# Patient Record
Sex: MALE | Race: WHITE | NOT HISPANIC OR LATINO
[De-identification: names, ages, dates, MRNs, and addresses within clinical notes are randomized per-mention and may not be internally consistent; named-entity substitution may affect disease eponyms.]

---

## 2021-01-01 ENCOUNTER — TRANSCRIPTION ENCOUNTER (OUTPATIENT)
Age: 86
End: 2021-01-01

## 2021-01-01 ENCOUNTER — INPATIENT (INPATIENT)
Facility: HOSPITAL | Age: 86
LOS: 4 days | Discharge: HOME HEALTH | DRG: 713 | End: 2021-10-16
Attending: SURGERY | Admitting: SURGERY
Payer: MEDICARE

## 2021-01-01 ENCOUNTER — APPOINTMENT (OUTPATIENT)
Dept: UROLOGY | Facility: HOSPITAL | Age: 86
End: 2021-01-01

## 2021-01-01 VITALS
HEART RATE: 62 BPM | RESPIRATION RATE: 16 BRPM | SYSTOLIC BLOOD PRESSURE: 138 MMHG | TEMPERATURE: 92 F | DIASTOLIC BLOOD PRESSURE: 61 MMHG | OXYGEN SATURATION: 95 %

## 2021-01-01 VITALS
SYSTOLIC BLOOD PRESSURE: 149 MMHG | HEART RATE: 88 BPM | RESPIRATION RATE: 17 BRPM | DIASTOLIC BLOOD PRESSURE: 79 MMHG | OXYGEN SATURATION: 95 % | TEMPERATURE: 98 F

## 2021-01-01 DIAGNOSIS — F03.90 UNSPECIFIED DEMENTIA, UNSPECIFIED SEVERITY, WITHOUT BEHAVIORAL DISTURBANCE, PSYCHOTIC DISTURBANCE, MOOD DISTURBANCE, AND ANXIETY: ICD-10-CM

## 2021-01-01 DIAGNOSIS — Z87.891 PERSONAL HISTORY OF NICOTINE DEPENDENCE: ICD-10-CM

## 2021-01-01 DIAGNOSIS — Z86.79 PERSONAL HISTORY OF OTHER DISEASES OF THE CIRCULATORY SYSTEM: ICD-10-CM

## 2021-01-01 DIAGNOSIS — H40.9 UNSPECIFIED GLAUCOMA: ICD-10-CM

## 2021-01-01 DIAGNOSIS — L89.623 PRESSURE ULCER OF LEFT HEEL, STAGE 3: ICD-10-CM

## 2021-01-01 DIAGNOSIS — Z86.16 PERSONAL HISTORY OF COVID-19: ICD-10-CM

## 2021-01-01 DIAGNOSIS — Z98.89 OTHER SPECIFIED POSTPROCEDURAL STATES: Chronic | ICD-10-CM

## 2021-01-01 DIAGNOSIS — R33.9 RETENTION OF URINE, UNSPECIFIED: ICD-10-CM

## 2021-01-01 DIAGNOSIS — R33.8 OTHER RETENTION OF URINE: ICD-10-CM

## 2021-01-01 DIAGNOSIS — N40.1 BENIGN PROSTATIC HYPERPLASIA WITH LOWER URINARY TRACT SYMPTOMS: ICD-10-CM

## 2021-01-01 DIAGNOSIS — Z01.818 ENCOUNTER FOR OTHER PREPROCEDURAL EXAMINATION: ICD-10-CM

## 2021-01-01 DIAGNOSIS — I45.10 UNSPECIFIED RIGHT BUNDLE-BRANCH BLOCK: ICD-10-CM

## 2021-01-01 DIAGNOSIS — E78.5 HYPERLIPIDEMIA, UNSPECIFIED: ICD-10-CM

## 2021-01-01 DIAGNOSIS — R68.0 HYPOTHERMIA, NOT ASSOCIATED WITH LOW ENVIRONMENTAL TEMPERATURE: ICD-10-CM

## 2021-01-01 DIAGNOSIS — N39.0 URINARY TRACT INFECTION, SITE NOT SPECIFIED: ICD-10-CM

## 2021-01-01 DIAGNOSIS — R45.1 RESTLESSNESS AND AGITATION: ICD-10-CM

## 2021-01-01 DIAGNOSIS — D63.8 ANEMIA IN OTHER CHRONIC DISEASES CLASSIFIED ELSEWHERE: ICD-10-CM

## 2021-01-01 DIAGNOSIS — I25.10 ATHEROSCLEROTIC HEART DISEASE OF NATIVE CORONARY ARTERY WITHOUT ANGINA PECTORIS: ICD-10-CM

## 2021-01-01 DIAGNOSIS — N13.8 OTHER OBSTRUCTIVE AND REFLUX UROPATHY: ICD-10-CM

## 2021-01-01 DIAGNOSIS — Z95.5 PRESENCE OF CORONARY ANGIOPLASTY IMPLANT AND GRAFT: ICD-10-CM

## 2021-01-01 DIAGNOSIS — I44.0 ATRIOVENTRICULAR BLOCK, FIRST DEGREE: ICD-10-CM

## 2021-01-01 DIAGNOSIS — N17.9 ACUTE KIDNEY FAILURE, UNSPECIFIED: ICD-10-CM

## 2021-01-01 DIAGNOSIS — Z87.892 PERSONAL HISTORY OF ANAPHYLAXIS: ICD-10-CM

## 2021-01-01 DIAGNOSIS — I10 ESSENTIAL (PRIMARY) HYPERTENSION: ICD-10-CM

## 2021-01-01 DIAGNOSIS — D64.9 ANEMIA, UNSPECIFIED: ICD-10-CM

## 2021-01-01 DIAGNOSIS — Z88.0 ALLERGY STATUS TO PENICILLIN: ICD-10-CM

## 2021-01-01 DIAGNOSIS — M19.90 UNSPECIFIED OSTEOARTHRITIS, UNSPECIFIED SITE: ICD-10-CM

## 2021-01-01 LAB
ANION GAP SERPL CALC-SCNC: 10 MMOL/L — SIGNIFICANT CHANGE UP (ref 5–17)
ANION GAP SERPL CALC-SCNC: 11 MMOL/L — SIGNIFICANT CHANGE UP (ref 5–17)
ANION GAP SERPL CALC-SCNC: 12 MMOL/L — SIGNIFICANT CHANGE UP (ref 5–17)
ANION GAP SERPL CALC-SCNC: 9 MMOL/L — SIGNIFICANT CHANGE UP (ref 5–17)
ANION GAP SERPL CALC-SCNC: 9 MMOL/L — SIGNIFICANT CHANGE UP (ref 5–17)
APTT BLD: 38.1 SEC — HIGH (ref 27.5–35.5)
BASOPHILS # BLD AUTO: 0.03 K/UL — SIGNIFICANT CHANGE UP (ref 0–0.2)
BASOPHILS # BLD AUTO: 0.05 K/UL — SIGNIFICANT CHANGE UP (ref 0–0.2)
BASOPHILS # BLD AUTO: 0.05 K/UL — SIGNIFICANT CHANGE UP (ref 0–0.2)
BASOPHILS NFR BLD AUTO: 0.4 % — SIGNIFICANT CHANGE UP (ref 0–2)
BASOPHILS NFR BLD AUTO: 0.7 % — SIGNIFICANT CHANGE UP (ref 0–2)
BASOPHILS NFR BLD AUTO: 0.9 % — SIGNIFICANT CHANGE UP (ref 0–2)
BLD GP AB SCN SERPL QL: NEGATIVE — SIGNIFICANT CHANGE UP
BLD GP AB SCN SERPL QL: NEGATIVE — SIGNIFICANT CHANGE UP
BUN SERPL-MCNC: 18 MG/DL — SIGNIFICANT CHANGE UP (ref 7–23)
BUN SERPL-MCNC: 19 MG/DL — SIGNIFICANT CHANGE UP (ref 7–23)
BUN SERPL-MCNC: 20 MG/DL — SIGNIFICANT CHANGE UP (ref 7–23)
BUN SERPL-MCNC: 27 MG/DL — HIGH (ref 7–23)
BUN SERPL-MCNC: 28 MG/DL — HIGH (ref 7–23)
CALCIUM SERPL-MCNC: 9 MG/DL — SIGNIFICANT CHANGE UP (ref 8.4–10.5)
CALCIUM SERPL-MCNC: 9.3 MG/DL — SIGNIFICANT CHANGE UP (ref 8.4–10.5)
CALCIUM SERPL-MCNC: 9.3 MG/DL — SIGNIFICANT CHANGE UP (ref 8.4–10.5)
CALCIUM SERPL-MCNC: 9.4 MG/DL — SIGNIFICANT CHANGE UP (ref 8.4–10.5)
CALCIUM SERPL-MCNC: 9.4 MG/DL — SIGNIFICANT CHANGE UP (ref 8.4–10.5)
CHLORIDE SERPL-SCNC: 103 MMOL/L — SIGNIFICANT CHANGE UP (ref 96–108)
CHLORIDE SERPL-SCNC: 104 MMOL/L — SIGNIFICANT CHANGE UP (ref 96–108)
CHLORIDE SERPL-SCNC: 105 MMOL/L — SIGNIFICANT CHANGE UP (ref 96–108)
CHLORIDE SERPL-SCNC: 106 MMOL/L — SIGNIFICANT CHANGE UP (ref 96–108)
CHLORIDE SERPL-SCNC: 108 MMOL/L — SIGNIFICANT CHANGE UP (ref 96–108)
CO2 SERPL-SCNC: 24 MMOL/L — SIGNIFICANT CHANGE UP (ref 22–31)
CO2 SERPL-SCNC: 25 MMOL/L — SIGNIFICANT CHANGE UP (ref 22–31)
CO2 SERPL-SCNC: 25 MMOL/L — SIGNIFICANT CHANGE UP (ref 22–31)
CO2 SERPL-SCNC: 26 MMOL/L — SIGNIFICANT CHANGE UP (ref 22–31)
CO2 SERPL-SCNC: 27 MMOL/L — SIGNIFICANT CHANGE UP (ref 22–31)
CORTIS AM PEAK SERPL-MCNC: 12.78 UG/DL — SIGNIFICANT CHANGE UP (ref 6.02–18.4)
COVID-19 SPIKE DOMAIN AB INTERP: POSITIVE
COVID-19 SPIKE DOMAIN ANTIBODY RESULT: >250 U/ML — HIGH
CREAT SERPL-MCNC: 1.14 MG/DL — SIGNIFICANT CHANGE UP (ref 0.5–1.3)
CREAT SERPL-MCNC: 1.34 MG/DL — HIGH (ref 0.5–1.3)
CREAT SERPL-MCNC: 1.39 MG/DL — HIGH (ref 0.5–1.3)
CREAT SERPL-MCNC: 1.4 MG/DL — HIGH (ref 0.5–1.3)
CREAT SERPL-MCNC: 1.46 MG/DL — HIGH (ref 0.5–1.3)
CULTURE RESULTS: NO GROWTH — SIGNIFICANT CHANGE UP
CULTURE RESULTS: NO GROWTH — SIGNIFICANT CHANGE UP
CULTURE RESULTS: SIGNIFICANT CHANGE UP
EOSINOPHIL # BLD AUTO: 0.07 K/UL — SIGNIFICANT CHANGE UP (ref 0–0.5)
EOSINOPHIL # BLD AUTO: 0.14 K/UL — SIGNIFICANT CHANGE UP (ref 0–0.5)
EOSINOPHIL # BLD AUTO: 0.28 K/UL — SIGNIFICANT CHANGE UP (ref 0–0.5)
EOSINOPHIL NFR BLD AUTO: 1 % — SIGNIFICANT CHANGE UP (ref 0–6)
EOSINOPHIL NFR BLD AUTO: 1.8 % — SIGNIFICANT CHANGE UP (ref 0–6)
EOSINOPHIL NFR BLD AUTO: 5.2 % — SIGNIFICANT CHANGE UP (ref 0–6)
GLUCOSE SERPL-MCNC: 113 MG/DL — HIGH (ref 70–99)
GLUCOSE SERPL-MCNC: 113 MG/DL — HIGH (ref 70–99)
GLUCOSE SERPL-MCNC: 114 MG/DL — HIGH (ref 70–99)
GLUCOSE SERPL-MCNC: 119 MG/DL — HIGH (ref 70–99)
GLUCOSE SERPL-MCNC: 88 MG/DL — SIGNIFICANT CHANGE UP (ref 70–99)
HCT VFR BLD CALC: 29.5 % — LOW (ref 39–50)
HCT VFR BLD CALC: 30.7 % — LOW (ref 39–50)
HCT VFR BLD CALC: 31.5 % — LOW (ref 39–50)
HCT VFR BLD CALC: 31.7 % — LOW (ref 39–50)
HCT VFR BLD CALC: 33.7 % — LOW (ref 39–50)
HGB BLD-MCNC: 10.1 G/DL — LOW (ref 13–17)
HGB BLD-MCNC: 9.4 G/DL — LOW (ref 13–17)
HGB BLD-MCNC: 9.5 G/DL — LOW (ref 13–17)
HGB BLD-MCNC: 9.8 G/DL — LOW (ref 13–17)
HGB BLD-MCNC: 9.9 G/DL — LOW (ref 13–17)
IMM GRANULOCYTES NFR BLD AUTO: 0.4 % — SIGNIFICANT CHANGE UP (ref 0–1.5)
IMM GRANULOCYTES NFR BLD AUTO: 0.6 % — SIGNIFICANT CHANGE UP (ref 0–1.5)
IMM GRANULOCYTES NFR BLD AUTO: 0.9 % — SIGNIFICANT CHANGE UP (ref 0–1.5)
INR BLD: 1.07 — SIGNIFICANT CHANGE UP (ref 0.88–1.16)
LYMPHOCYTES # BLD AUTO: 1.04 K/UL — SIGNIFICANT CHANGE UP (ref 1–3.3)
LYMPHOCYTES # BLD AUTO: 1.24 K/UL — SIGNIFICANT CHANGE UP (ref 1–3.3)
LYMPHOCYTES # BLD AUTO: 1.84 K/UL — SIGNIFICANT CHANGE UP (ref 1–3.3)
LYMPHOCYTES # BLD AUTO: 15.4 % — SIGNIFICANT CHANGE UP (ref 13–44)
LYMPHOCYTES # BLD AUTO: 16.3 % — SIGNIFICANT CHANGE UP (ref 13–44)
LYMPHOCYTES # BLD AUTO: 34.1 % — SIGNIFICANT CHANGE UP (ref 13–44)
MAGNESIUM SERPL-MCNC: 1.6 MG/DL — SIGNIFICANT CHANGE UP (ref 1.6–2.6)
MAGNESIUM SERPL-MCNC: 1.7 MG/DL — SIGNIFICANT CHANGE UP (ref 1.6–2.6)
MAGNESIUM SERPL-MCNC: 1.8 MG/DL — SIGNIFICANT CHANGE UP (ref 1.6–2.6)
MAGNESIUM SERPL-MCNC: 1.8 MG/DL — SIGNIFICANT CHANGE UP (ref 1.6–2.6)
MAGNESIUM SERPL-MCNC: 2 MG/DL — SIGNIFICANT CHANGE UP (ref 1.6–2.6)
MCHC RBC-ENTMCNC: 27.7 PG — SIGNIFICANT CHANGE UP (ref 27–34)
MCHC RBC-ENTMCNC: 28.3 PG — SIGNIFICANT CHANGE UP (ref 27–34)
MCHC RBC-ENTMCNC: 28.3 PG — SIGNIFICANT CHANGE UP (ref 27–34)
MCHC RBC-ENTMCNC: 28.4 PG — SIGNIFICANT CHANGE UP (ref 27–34)
MCHC RBC-ENTMCNC: 28.7 PG — SIGNIFICANT CHANGE UP (ref 27–34)
MCHC RBC-ENTMCNC: 30 GM/DL — LOW (ref 32–36)
MCHC RBC-ENTMCNC: 30.9 GM/DL — LOW (ref 32–36)
MCHC RBC-ENTMCNC: 31.1 GM/DL — LOW (ref 32–36)
MCHC RBC-ENTMCNC: 31.2 GM/DL — LOW (ref 32–36)
MCHC RBC-ENTMCNC: 31.9 GM/DL — LOW (ref 32–36)
MCV RBC AUTO: 89.9 FL — SIGNIFICANT CHANGE UP (ref 80–100)
MCV RBC AUTO: 91 FL — SIGNIFICANT CHANGE UP (ref 80–100)
MCV RBC AUTO: 91.1 FL — SIGNIFICANT CHANGE UP (ref 80–100)
MCV RBC AUTO: 91.4 FL — SIGNIFICANT CHANGE UP (ref 80–100)
MCV RBC AUTO: 92.6 FL — SIGNIFICANT CHANGE UP (ref 80–100)
MONOCYTES # BLD AUTO: 0.58 K/UL — SIGNIFICANT CHANGE UP (ref 0–0.9)
MONOCYTES # BLD AUTO: 0.66 K/UL — SIGNIFICANT CHANGE UP (ref 0–0.9)
MONOCYTES # BLD AUTO: 0.79 K/UL — SIGNIFICANT CHANGE UP (ref 0–0.9)
MONOCYTES NFR BLD AUTO: 14.6 % — HIGH (ref 2–14)
MONOCYTES NFR BLD AUTO: 7.6 % — SIGNIFICANT CHANGE UP (ref 2–14)
MONOCYTES NFR BLD AUTO: 9.8 % — SIGNIFICANT CHANGE UP (ref 2–14)
NEUTROPHILS # BLD AUTO: 2.41 K/UL — SIGNIFICANT CHANGE UP (ref 1.8–7.4)
NEUTROPHILS # BLD AUTO: 4.91 K/UL — SIGNIFICANT CHANGE UP (ref 1.8–7.4)
NEUTROPHILS # BLD AUTO: 5.55 K/UL — SIGNIFICANT CHANGE UP (ref 1.8–7.4)
NEUTROPHILS NFR BLD AUTO: 44.6 % — SIGNIFICANT CHANGE UP (ref 43–77)
NEUTROPHILS NFR BLD AUTO: 72.7 % — SIGNIFICANT CHANGE UP (ref 43–77)
NEUTROPHILS NFR BLD AUTO: 73 % — SIGNIFICANT CHANGE UP (ref 43–77)
NRBC # BLD: 0 /100 WBCS — SIGNIFICANT CHANGE UP (ref 0–0)
PHOSPHATE SERPL-MCNC: 2.9 MG/DL — SIGNIFICANT CHANGE UP (ref 2.5–4.5)
PHOSPHATE SERPL-MCNC: 3 MG/DL — SIGNIFICANT CHANGE UP (ref 2.5–4.5)
PHOSPHATE SERPL-MCNC: 3.6 MG/DL — SIGNIFICANT CHANGE UP (ref 2.5–4.5)
PHOSPHATE SERPL-MCNC: 3.7 MG/DL — SIGNIFICANT CHANGE UP (ref 2.5–4.5)
PHOSPHATE SERPL-MCNC: 3.9 MG/DL — SIGNIFICANT CHANGE UP (ref 2.5–4.5)
PLATELET # BLD AUTO: 196 K/UL — SIGNIFICANT CHANGE UP (ref 150–400)
PLATELET # BLD AUTO: 216 K/UL — SIGNIFICANT CHANGE UP (ref 150–400)
PLATELET # BLD AUTO: 218 K/UL — SIGNIFICANT CHANGE UP (ref 150–400)
PLATELET # BLD AUTO: 238 K/UL — SIGNIFICANT CHANGE UP (ref 150–400)
PLATELET # BLD AUTO: 252 K/UL — SIGNIFICANT CHANGE UP (ref 150–400)
POTASSIUM SERPL-MCNC: 3.8 MMOL/L — SIGNIFICANT CHANGE UP (ref 3.5–5.3)
POTASSIUM SERPL-MCNC: 3.9 MMOL/L — SIGNIFICANT CHANGE UP (ref 3.5–5.3)
POTASSIUM SERPL-MCNC: 4.1 MMOL/L — SIGNIFICANT CHANGE UP (ref 3.5–5.3)
POTASSIUM SERPL-MCNC: 4.2 MMOL/L — SIGNIFICANT CHANGE UP (ref 3.5–5.3)
POTASSIUM SERPL-MCNC: 4.2 MMOL/L — SIGNIFICANT CHANGE UP (ref 3.5–5.3)
POTASSIUM SERPL-SCNC: 3.8 MMOL/L — SIGNIFICANT CHANGE UP (ref 3.5–5.3)
POTASSIUM SERPL-SCNC: 3.9 MMOL/L — SIGNIFICANT CHANGE UP (ref 3.5–5.3)
POTASSIUM SERPL-SCNC: 4.1 MMOL/L — SIGNIFICANT CHANGE UP (ref 3.5–5.3)
POTASSIUM SERPL-SCNC: 4.2 MMOL/L — SIGNIFICANT CHANGE UP (ref 3.5–5.3)
POTASSIUM SERPL-SCNC: 4.2 MMOL/L — SIGNIFICANT CHANGE UP (ref 3.5–5.3)
PROTHROM AB SERPL-ACNC: 12.8 SEC — SIGNIFICANT CHANGE UP (ref 10.6–13.6)
RBC # BLD: 3.28 M/UL — LOW (ref 4.2–5.8)
RBC # BLD: 3.36 M/UL — LOW (ref 4.2–5.8)
RBC # BLD: 3.46 M/UL — LOW (ref 4.2–5.8)
RBC # BLD: 3.48 M/UL — LOW (ref 4.2–5.8)
RBC # BLD: 3.64 M/UL — LOW (ref 4.2–5.8)
RBC # FLD: 18.1 % — HIGH (ref 10.3–14.5)
RBC # FLD: 18.1 % — HIGH (ref 10.3–14.5)
RBC # FLD: 18.4 % — HIGH (ref 10.3–14.5)
RBC # FLD: 18.5 % — HIGH (ref 10.3–14.5)
RBC # FLD: 18.6 % — HIGH (ref 10.3–14.5)
RH IG SCN BLD-IMP: POSITIVE — SIGNIFICANT CHANGE UP
RH IG SCN BLD-IMP: POSITIVE — SIGNIFICANT CHANGE UP
SARS-COV-2 IGG+IGM SERPL QL IA: >250 U/ML — HIGH
SARS-COV-2 IGG+IGM SERPL QL IA: POSITIVE
SARS-COV-2 RNA SPEC QL NAA+PROBE: SIGNIFICANT CHANGE UP
SODIUM SERPL-SCNC: 140 MMOL/L — SIGNIFICANT CHANGE UP (ref 135–145)
SODIUM SERPL-SCNC: 140 MMOL/L — SIGNIFICANT CHANGE UP (ref 135–145)
SODIUM SERPL-SCNC: 141 MMOL/L — SIGNIFICANT CHANGE UP (ref 135–145)
SODIUM SERPL-SCNC: 141 MMOL/L — SIGNIFICANT CHANGE UP (ref 135–145)
SODIUM SERPL-SCNC: 142 MMOL/L — SIGNIFICANT CHANGE UP (ref 135–145)
SPECIMEN SOURCE: SIGNIFICANT CHANGE UP
TSH SERPL-MCNC: 1.95 UIU/ML — SIGNIFICANT CHANGE UP (ref 0.27–4.2)
WBC # BLD: 5.4 K/UL — SIGNIFICANT CHANGE UP (ref 3.8–10.5)
WBC # BLD: 5.56 K/UL — SIGNIFICANT CHANGE UP (ref 3.8–10.5)
WBC # BLD: 6.21 K/UL — SIGNIFICANT CHANGE UP (ref 3.8–10.5)
WBC # BLD: 6.76 K/UL — SIGNIFICANT CHANGE UP (ref 3.8–10.5)
WBC # BLD: 7.61 K/UL — SIGNIFICANT CHANGE UP (ref 3.8–10.5)
WBC # FLD AUTO: 5.4 K/UL — SIGNIFICANT CHANGE UP (ref 3.8–10.5)
WBC # FLD AUTO: 5.56 K/UL — SIGNIFICANT CHANGE UP (ref 3.8–10.5)
WBC # FLD AUTO: 6.21 K/UL — SIGNIFICANT CHANGE UP (ref 3.8–10.5)
WBC # FLD AUTO: 6.76 K/UL — SIGNIFICANT CHANGE UP (ref 3.8–10.5)
WBC # FLD AUTO: 7.61 K/UL — SIGNIFICANT CHANGE UP (ref 3.8–10.5)

## 2021-01-01 PROCEDURE — 85610 PROTHROMBIN TIME: CPT

## 2021-01-01 PROCEDURE — 87040 BLOOD CULTURE FOR BACTERIA: CPT

## 2021-01-01 PROCEDURE — P9016: CPT

## 2021-01-01 PROCEDURE — 83735 ASSAY OF MAGNESIUM: CPT

## 2021-01-01 PROCEDURE — 84100 ASSAY OF PHOSPHORUS: CPT

## 2021-01-01 PROCEDURE — 84443 ASSAY THYROID STIM HORMONE: CPT

## 2021-01-01 PROCEDURE — 86900 BLOOD TYPING SEROLOGIC ABO: CPT

## 2021-01-01 PROCEDURE — 85027 COMPLETE CBC AUTOMATED: CPT

## 2021-01-01 PROCEDURE — 82533 TOTAL CORTISOL: CPT

## 2021-01-01 PROCEDURE — 80048 BASIC METABOLIC PNL TOTAL CA: CPT

## 2021-01-01 PROCEDURE — 90662 IIV NO PRSV INCREASED AG IM: CPT

## 2021-01-01 PROCEDURE — 97110 THERAPEUTIC EXERCISES: CPT

## 2021-01-01 PROCEDURE — 52648 LASER SURGERY OF PROSTATE: CPT

## 2021-01-01 PROCEDURE — 36415 COLL VENOUS BLD VENIPUNCTURE: CPT

## 2021-01-01 PROCEDURE — 97530 THERAPEUTIC ACTIVITIES: CPT

## 2021-01-01 PROCEDURE — 85730 THROMBOPLASTIN TIME PARTIAL: CPT

## 2021-01-01 PROCEDURE — 99233 SBSQ HOSP IP/OBS HIGH 50: CPT

## 2021-01-01 PROCEDURE — 71045 X-RAY EXAM CHEST 1 VIEW: CPT | Mod: 26

## 2021-01-01 PROCEDURE — 99221 1ST HOSP IP/OBS SF/LOW 40: CPT

## 2021-01-01 PROCEDURE — 76775 US EXAM ABDO BACK WALL LIM: CPT | Mod: 26

## 2021-01-01 PROCEDURE — 86901 BLOOD TYPING SEROLOGIC RH(D): CPT

## 2021-01-01 PROCEDURE — 87635 SARS-COV-2 COVID-19 AMP PRB: CPT

## 2021-01-01 PROCEDURE — C1889: CPT

## 2021-01-01 PROCEDURE — 36430 TRANSFUSION BLD/BLD COMPNT: CPT

## 2021-01-01 PROCEDURE — 97161 PT EVAL LOW COMPLEX 20 MIN: CPT

## 2021-01-01 PROCEDURE — 86923 COMPATIBILITY TEST ELECTRIC: CPT

## 2021-01-01 PROCEDURE — 86850 RBC ANTIBODY SCREEN: CPT

## 2021-01-01 PROCEDURE — 76775 US EXAM ABDO BACK WALL LIM: CPT

## 2021-01-01 PROCEDURE — 86769 SARS-COV-2 COVID-19 ANTIBODY: CPT

## 2021-01-01 PROCEDURE — 71045 X-RAY EXAM CHEST 1 VIEW: CPT

## 2021-01-01 PROCEDURE — 87086 URINE CULTURE/COLONY COUNT: CPT

## 2021-01-01 PROCEDURE — 85025 COMPLETE CBC W/AUTO DIFF WBC: CPT

## 2021-01-01 PROCEDURE — 99233 SBSQ HOSP IP/OBS HIGH 50: CPT | Mod: GC

## 2021-01-01 RX ORDER — LATANOPROST 0.05 MG/ML
1 SOLUTION/ DROPS OPHTHALMIC; TOPICAL AT BEDTIME
Refills: 0 | Status: DISCONTINUED | OUTPATIENT
Start: 2021-01-01 | End: 2021-01-01

## 2021-01-01 RX ORDER — ACETAMINOPHEN 500 MG
650 TABLET ORAL EVERY 6 HOURS
Refills: 0 | Status: DISCONTINUED | OUTPATIENT
Start: 2021-01-01 | End: 2021-01-01

## 2021-01-01 RX ORDER — DORZOLAMIDE HYDROCHLORIDE 20 MG/ML
1 SOLUTION/ DROPS OPHTHALMIC THREE TIMES A DAY
Refills: 0 | Status: DISCONTINUED | OUTPATIENT
Start: 2021-01-01 | End: 2021-01-01

## 2021-01-01 RX ORDER — SODIUM CHLORIDE 9 MG/ML
1000 INJECTION INTRAMUSCULAR; INTRAVENOUS; SUBCUTANEOUS
Refills: 0 | Status: DISCONTINUED | OUTPATIENT
Start: 2021-01-01 | End: 2021-01-01

## 2021-01-01 RX ORDER — ATORVASTATIN CALCIUM 80 MG/1
1 TABLET, FILM COATED ORAL
Qty: 0 | Refills: 0 | DISCHARGE
Start: 2021-01-01

## 2021-01-01 RX ORDER — PANTOPRAZOLE SODIUM 20 MG/1
40 TABLET, DELAYED RELEASE ORAL
Refills: 0 | Status: DISCONTINUED | OUTPATIENT
Start: 2021-01-01 | End: 2021-01-01

## 2021-01-01 RX ORDER — OLANZAPINE 15 MG/1
2.5 TABLET, FILM COATED ORAL ONCE
Refills: 0 | Status: COMPLETED | OUTPATIENT
Start: 2021-01-01 | End: 2021-01-01

## 2021-01-01 RX ORDER — OLANZAPINE 15 MG/1
1 TABLET, FILM COATED ORAL
Qty: 0 | Refills: 0 | DISCHARGE
Start: 2021-01-01

## 2021-01-01 RX ORDER — CHOLECALCIFEROL (VITAMIN D3) 125 MCG
2000 CAPSULE ORAL
Qty: 0 | Refills: 0 | DISCHARGE
Start: 2021-01-01

## 2021-01-01 RX ORDER — AMLODIPINE BESYLATE 2.5 MG/1
5 TABLET ORAL DAILY
Refills: 0 | Status: DISCONTINUED | OUTPATIENT
Start: 2021-01-01 | End: 2021-01-01

## 2021-01-01 RX ORDER — POLYETHYLENE GLYCOL 3350 17 G/17G
17 POWDER, FOR SOLUTION ORAL DAILY
Refills: 0 | Status: DISCONTINUED | OUTPATIENT
Start: 2021-01-01 | End: 2021-01-01

## 2021-01-01 RX ORDER — AMLODIPINE BESYLATE 2.5 MG/1
1 TABLET ORAL
Qty: 0 | Refills: 0 | DISCHARGE
Start: 2021-01-01

## 2021-01-01 RX ORDER — MAGNESIUM SULFATE 500 MG/ML
1 VIAL (ML) INJECTION ONCE
Refills: 0 | Status: COMPLETED | OUTPATIENT
Start: 2021-01-01 | End: 2021-01-01

## 2021-01-01 RX ORDER — TAMSULOSIN HYDROCHLORIDE 0.4 MG/1
0.4 CAPSULE ORAL ONCE
Refills: 0 | Status: COMPLETED | OUTPATIENT
Start: 2021-01-01 | End: 2021-01-01

## 2021-01-01 RX ORDER — ONDANSETRON 8 MG/1
4 TABLET, FILM COATED ORAL EVERY 4 HOURS
Refills: 0 | Status: DISCONTINUED | OUTPATIENT
Start: 2021-01-01 | End: 2021-01-01

## 2021-01-01 RX ORDER — ASPIRIN/CALCIUM CARB/MAGNESIUM 324 MG
81 TABLET ORAL DAILY
Refills: 0 | Status: DISCONTINUED | OUTPATIENT
Start: 2021-01-01 | End: 2021-01-01

## 2021-01-01 RX ORDER — OLANZAPINE 15 MG/1
2.5 TABLET, FILM COATED ORAL
Refills: 0 | Status: DISCONTINUED | OUTPATIENT
Start: 2021-01-01 | End: 2021-01-01

## 2021-01-01 RX ORDER — SODIUM CHLORIDE 9 MG/ML
1000 INJECTION, SOLUTION INTRAVENOUS
Refills: 0 | Status: DISCONTINUED | OUTPATIENT
Start: 2021-01-01 | End: 2021-01-01

## 2021-01-01 RX ORDER — INFLUENZA VIRUS VACCINE 15; 15; 15; 15 UG/.5ML; UG/.5ML; UG/.5ML; UG/.5ML
0.7 SUSPENSION INTRAMUSCULAR ONCE
Refills: 0 | Status: COMPLETED | OUTPATIENT
Start: 2021-01-01 | End: 2021-01-01

## 2021-01-01 RX ORDER — CHOLECALCIFEROL (VITAMIN D3) 125 MCG
2000 CAPSULE ORAL DAILY
Refills: 0 | Status: DISCONTINUED | OUTPATIENT
Start: 2021-01-01 | End: 2021-01-01

## 2021-01-01 RX ORDER — QUETIAPINE FUMARATE 200 MG/1
25 TABLET, FILM COATED ORAL ONCE
Refills: 0 | Status: COMPLETED | OUTPATIENT
Start: 2021-01-01 | End: 2021-01-01

## 2021-01-01 RX ORDER — PANTOPRAZOLE SODIUM 20 MG/1
1 TABLET, DELAYED RELEASE ORAL
Qty: 0 | Refills: 0 | DISCHARGE
Start: 2021-01-01

## 2021-01-01 RX ORDER — OLANZAPINE 15 MG/1
2.5 TABLET, FILM COATED ORAL AT BEDTIME
Refills: 0 | Status: DISCONTINUED | OUTPATIENT
Start: 2021-01-01 | End: 2021-01-01

## 2021-01-01 RX ORDER — CEFUROXIME AXETIL 250 MG
250 TABLET ORAL EVERY 12 HOURS
Refills: 0 | Status: DISCONTINUED | OUTPATIENT
Start: 2021-01-01 | End: 2021-01-01

## 2021-01-01 RX ORDER — OLANZAPINE 15 MG/1
2.5 TABLET, FILM COATED ORAL DAILY
Refills: 0 | Status: DISCONTINUED | OUTPATIENT
Start: 2021-01-01 | End: 2021-01-01

## 2021-01-01 RX ORDER — LATANOPROST 0.05 MG/ML
1 SOLUTION/ DROPS OPHTHALMIC; TOPICAL
Qty: 0 | Refills: 0 | DISCHARGE
Start: 2021-01-01

## 2021-01-01 RX ORDER — HEPARIN SODIUM 5000 [USP'U]/ML
5000 INJECTION INTRAVENOUS; SUBCUTANEOUS EVERY 8 HOURS
Refills: 0 | Status: DISCONTINUED | OUTPATIENT
Start: 2021-01-01 | End: 2021-01-01

## 2021-01-01 RX ORDER — ATORVASTATIN CALCIUM 80 MG/1
40 TABLET, FILM COATED ORAL AT BEDTIME
Refills: 0 | Status: DISCONTINUED | OUTPATIENT
Start: 2021-01-01 | End: 2021-01-01

## 2021-01-01 RX ORDER — PREGABALIN 225 MG/1
1 CAPSULE ORAL
Qty: 0 | Refills: 0 | DISCHARGE
Start: 2021-01-01

## 2021-01-01 RX ORDER — CEFTRIAXONE 500 MG/1
1000 INJECTION, POWDER, FOR SOLUTION INTRAMUSCULAR; INTRAVENOUS EVERY 24 HOURS
Refills: 0 | Status: DISCONTINUED | OUTPATIENT
Start: 2021-01-01 | End: 2021-01-01

## 2021-01-01 RX ORDER — CALCIUM CARBONATE 500(1250)
2.6 TABLET ORAL
Qty: 0 | Refills: 0 | DISCHARGE
Start: 2021-01-01

## 2021-01-01 RX ORDER — DORZOLAMIDE HYDROCHLORIDE 20 MG/ML
1 SOLUTION/ DROPS OPHTHALMIC
Qty: 0 | Refills: 0 | DISCHARGE
Start: 2021-01-01

## 2021-01-01 RX ORDER — CALCIUM CARBONATE 500(1250)
650 TABLET ORAL
Refills: 0 | Status: DISCONTINUED | OUTPATIENT
Start: 2021-01-01 | End: 2021-01-01

## 2021-01-01 RX ORDER — PREGABALIN 225 MG/1
1000 CAPSULE ORAL DAILY
Refills: 0 | Status: DISCONTINUED | OUTPATIENT
Start: 2021-01-01 | End: 2021-01-01

## 2021-01-01 RX ADMIN — HEPARIN SODIUM 5000 UNIT(S): 5000 INJECTION INTRAVENOUS; SUBCUTANEOUS at 05:36

## 2021-01-01 RX ADMIN — OLANZAPINE 2.5 MILLIGRAM(S): 15 TABLET, FILM COATED ORAL at 06:31

## 2021-01-01 RX ADMIN — POLYETHYLENE GLYCOL 3350 17 GRAM(S): 17 POWDER, FOR SOLUTION ORAL at 14:15

## 2021-01-01 RX ADMIN — LATANOPROST 1 DROP(S): 0.05 SOLUTION/ DROPS OPHTHALMIC; TOPICAL at 22:39

## 2021-01-01 RX ADMIN — DORZOLAMIDE HYDROCHLORIDE 1 DROP(S): 20 SOLUTION/ DROPS OPHTHALMIC at 07:37

## 2021-01-01 RX ADMIN — INFLUENZA VIRUS VACCINE 0.7 MILLILITER(S): 15; 15; 15; 15 SUSPENSION INTRAMUSCULAR at 22:31

## 2021-01-01 RX ADMIN — PANTOPRAZOLE SODIUM 40 MILLIGRAM(S): 20 TABLET, DELAYED RELEASE ORAL at 07:37

## 2021-01-01 RX ADMIN — HEPARIN SODIUM 5000 UNIT(S): 5000 INJECTION INTRAVENOUS; SUBCUTANEOUS at 22:17

## 2021-01-01 RX ADMIN — PANTOPRAZOLE SODIUM 40 MILLIGRAM(S): 20 TABLET, DELAYED RELEASE ORAL at 06:31

## 2021-01-01 RX ADMIN — Medication 2000 UNIT(S): at 12:32

## 2021-01-01 RX ADMIN — HEPARIN SODIUM 5000 UNIT(S): 5000 INJECTION INTRAVENOUS; SUBCUTANEOUS at 14:15

## 2021-01-01 RX ADMIN — Medication 2000 UNIT(S): at 14:14

## 2021-01-01 RX ADMIN — Medication 650 MILLIGRAM(S): at 06:21

## 2021-01-01 RX ADMIN — AMLODIPINE BESYLATE 5 MILLIGRAM(S): 2.5 TABLET ORAL at 06:31

## 2021-01-01 RX ADMIN — DORZOLAMIDE HYDROCHLORIDE 1 DROP(S): 20 SOLUTION/ DROPS OPHTHALMIC at 23:50

## 2021-01-01 RX ADMIN — PREGABALIN 1000 MICROGRAM(S): 225 CAPSULE ORAL at 12:32

## 2021-01-01 RX ADMIN — Medication 650 MILLIGRAM(S): at 05:35

## 2021-01-01 RX ADMIN — DORZOLAMIDE HYDROCHLORIDE 1 DROP(S): 20 SOLUTION/ DROPS OPHTHALMIC at 22:14

## 2021-01-01 RX ADMIN — Medication 650 MILLIGRAM(S): at 06:24

## 2021-01-01 RX ADMIN — AMLODIPINE BESYLATE 5 MILLIGRAM(S): 2.5 TABLET ORAL at 06:21

## 2021-01-01 RX ADMIN — HEPARIN SODIUM 5000 UNIT(S): 5000 INJECTION INTRAVENOUS; SUBCUTANEOUS at 14:25

## 2021-01-01 RX ADMIN — Medication 650 MILLIGRAM(S): at 12:36

## 2021-01-01 RX ADMIN — DORZOLAMIDE HYDROCHLORIDE 1 DROP(S): 20 SOLUTION/ DROPS OPHTHALMIC at 14:25

## 2021-01-01 RX ADMIN — DORZOLAMIDE HYDROCHLORIDE 1 DROP(S): 20 SOLUTION/ DROPS OPHTHALMIC at 06:19

## 2021-01-01 RX ADMIN — Medication 81 MILLIGRAM(S): at 12:32

## 2021-01-01 RX ADMIN — Medication 2000 UNIT(S): at 12:19

## 2021-01-01 RX ADMIN — PANTOPRAZOLE SODIUM 40 MILLIGRAM(S): 20 TABLET, DELAYED RELEASE ORAL at 06:29

## 2021-01-01 RX ADMIN — HEPARIN SODIUM 5000 UNIT(S): 5000 INJECTION INTRAVENOUS; SUBCUTANEOUS at 13:15

## 2021-01-01 RX ADMIN — ATORVASTATIN CALCIUM 40 MILLIGRAM(S): 80 TABLET, FILM COATED ORAL at 00:10

## 2021-01-01 RX ADMIN — DORZOLAMIDE HYDROCHLORIDE 1 DROP(S): 20 SOLUTION/ DROPS OPHTHALMIC at 06:24

## 2021-01-01 RX ADMIN — Medication 81 MILLIGRAM(S): at 12:19

## 2021-01-01 RX ADMIN — DORZOLAMIDE HYDROCHLORIDE 1 DROP(S): 20 SOLUTION/ DROPS OPHTHALMIC at 06:31

## 2021-01-01 RX ADMIN — DORZOLAMIDE HYDROCHLORIDE 1 DROP(S): 20 SOLUTION/ DROPS OPHTHALMIC at 13:12

## 2021-01-01 RX ADMIN — DORZOLAMIDE HYDROCHLORIDE 1 DROP(S): 20 SOLUTION/ DROPS OPHTHALMIC at 22:38

## 2021-01-01 RX ADMIN — DORZOLAMIDE HYDROCHLORIDE 1 DROP(S): 20 SOLUTION/ DROPS OPHTHALMIC at 22:16

## 2021-01-01 RX ADMIN — Medication 650 MILLIGRAM(S): at 23:33

## 2021-01-01 RX ADMIN — HEPARIN SODIUM 5000 UNIT(S): 5000 INJECTION INTRAVENOUS; SUBCUTANEOUS at 07:37

## 2021-01-01 RX ADMIN — ATORVASTATIN CALCIUM 40 MILLIGRAM(S): 80 TABLET, FILM COATED ORAL at 22:33

## 2021-01-01 RX ADMIN — HEPARIN SODIUM 5000 UNIT(S): 5000 INJECTION INTRAVENOUS; SUBCUTANEOUS at 06:30

## 2021-01-01 RX ADMIN — ATORVASTATIN CALCIUM 40 MILLIGRAM(S): 80 TABLET, FILM COATED ORAL at 22:17

## 2021-01-01 RX ADMIN — Medication 650 MILLIGRAM(S): at 13:36

## 2021-01-01 RX ADMIN — AMLODIPINE BESYLATE 5 MILLIGRAM(S): 2.5 TABLET ORAL at 07:37

## 2021-01-01 RX ADMIN — LATANOPROST 1 DROP(S): 0.05 SOLUTION/ DROPS OPHTHALMIC; TOPICAL at 22:15

## 2021-01-01 RX ADMIN — DORZOLAMIDE HYDROCHLORIDE 1 DROP(S): 20 SOLUTION/ DROPS OPHTHALMIC at 22:52

## 2021-01-01 RX ADMIN — Medication 650 MILLIGRAM(S): at 22:33

## 2021-01-01 RX ADMIN — PREGABALIN 1000 MICROGRAM(S): 225 CAPSULE ORAL at 12:19

## 2021-01-01 RX ADMIN — PANTOPRAZOLE SODIUM 40 MILLIGRAM(S): 20 TABLET, DELAYED RELEASE ORAL at 07:00

## 2021-01-01 RX ADMIN — DORZOLAMIDE HYDROCHLORIDE 1 DROP(S): 20 SOLUTION/ DROPS OPHTHALMIC at 14:30

## 2021-01-01 RX ADMIN — LATANOPROST 1 DROP(S): 0.05 SOLUTION/ DROPS OPHTHALMIC; TOPICAL at 23:50

## 2021-01-01 RX ADMIN — QUETIAPINE FUMARATE 25 MILLIGRAM(S): 200 TABLET, FILM COATED ORAL at 00:10

## 2021-01-01 RX ADMIN — HEPARIN SODIUM 5000 UNIT(S): 5000 INJECTION INTRAVENOUS; SUBCUTANEOUS at 22:33

## 2021-01-01 RX ADMIN — DORZOLAMIDE HYDROCHLORIDE 1 DROP(S): 20 SOLUTION/ DROPS OPHTHALMIC at 05:36

## 2021-01-01 RX ADMIN — HEPARIN SODIUM 5000 UNIT(S): 5000 INJECTION INTRAVENOUS; SUBCUTANEOUS at 06:18

## 2021-01-01 RX ADMIN — HEPARIN SODIUM 5000 UNIT(S): 5000 INJECTION INTRAVENOUS; SUBCUTANEOUS at 00:10

## 2021-01-01 RX ADMIN — Medication 650 MILLIGRAM(S): at 19:05

## 2021-01-01 RX ADMIN — Medication 650 MILLIGRAM(S): at 06:29

## 2021-01-01 RX ADMIN — HEPARIN SODIUM 5000 UNIT(S): 5000 INJECTION INTRAVENOUS; SUBCUTANEOUS at 22:50

## 2021-01-01 RX ADMIN — HEPARIN SODIUM 5000 UNIT(S): 5000 INJECTION INTRAVENOUS; SUBCUTANEOUS at 06:31

## 2021-01-01 RX ADMIN — TAMSULOSIN HYDROCHLORIDE 0.4 MILLIGRAM(S): 0.4 CAPSULE ORAL at 14:24

## 2021-01-01 RX ADMIN — ATORVASTATIN CALCIUM 40 MILLIGRAM(S): 80 TABLET, FILM COATED ORAL at 22:53

## 2021-01-01 RX ADMIN — PANTOPRAZOLE SODIUM 40 MILLIGRAM(S): 20 TABLET, DELAYED RELEASE ORAL at 06:19

## 2021-01-01 RX ADMIN — CEFTRIAXONE 100 MILLIGRAM(S): 500 INJECTION, POWDER, FOR SOLUTION INTRAMUSCULAR; INTRAVENOUS at 14:24

## 2021-01-01 RX ADMIN — AMLODIPINE BESYLATE 5 MILLIGRAM(S): 2.5 TABLET ORAL at 05:35

## 2021-01-01 RX ADMIN — OLANZAPINE 2.5 MILLIGRAM(S): 15 TABLET, FILM COATED ORAL at 19:06

## 2021-01-01 RX ADMIN — CEFTRIAXONE 100 MILLIGRAM(S): 500 INJECTION, POWDER, FOR SOLUTION INTRAMUSCULAR; INTRAVENOUS at 13:38

## 2021-01-01 RX ADMIN — LATANOPROST 1 DROP(S): 0.05 SOLUTION/ DROPS OPHTHALMIC; TOPICAL at 22:17

## 2021-01-01 RX ADMIN — Medication 250 MILLIGRAM(S): at 06:20

## 2021-01-01 RX ADMIN — Medication 81 MILLIGRAM(S): at 13:11

## 2021-01-01 RX ADMIN — Medication 650 MILLIGRAM(S): at 23:52

## 2021-01-01 RX ADMIN — LATANOPROST 1 DROP(S): 0.05 SOLUTION/ DROPS OPHTHALMIC; TOPICAL at 22:52

## 2021-01-01 RX ADMIN — PREGABALIN 1000 MICROGRAM(S): 225 CAPSULE ORAL at 13:11

## 2021-01-01 RX ADMIN — Medication 100 GRAM(S): at 08:39

## 2021-01-01 RX ADMIN — Medication 650 MILLIGRAM(S): at 06:35

## 2021-01-01 RX ADMIN — Medication 2000 UNIT(S): at 13:11

## 2021-01-01 RX ADMIN — Medication 81 MILLIGRAM(S): at 14:14

## 2021-01-01 RX ADMIN — CEFTRIAXONE 100 MILLIGRAM(S): 500 INJECTION, POWDER, FOR SOLUTION INTRAMUSCULAR; INTRAVENOUS at 14:15

## 2021-01-01 RX ADMIN — HEPARIN SODIUM 5000 UNIT(S): 5000 INJECTION INTRAVENOUS; SUBCUTANEOUS at 13:39

## 2021-01-01 RX ADMIN — Medication 650 MILLIGRAM(S): at 05:39

## 2021-01-01 RX ADMIN — CEFTRIAXONE 100 MILLIGRAM(S): 500 INJECTION, POWDER, FOR SOLUTION INTRAMUSCULAR; INTRAVENOUS at 13:12

## 2021-01-01 RX ADMIN — Medication 650 MILLIGRAM(S): at 06:00

## 2021-01-01 RX ADMIN — PREGABALIN 1000 MICROGRAM(S): 225 CAPSULE ORAL at 14:15

## 2021-01-01 RX ADMIN — OLANZAPINE 2.5 MILLIGRAM(S): 15 TABLET, FILM COATED ORAL at 22:34

## 2021-01-01 RX ADMIN — Medication 650 MILLIGRAM(S): at 22:52

## 2021-01-01 RX ADMIN — DORZOLAMIDE HYDROCHLORIDE 1 DROP(S): 20 SOLUTION/ DROPS OPHTHALMIC at 14:02

## 2021-10-11 NOTE — H&P ADULT - ASSESSMENT
Patient is a 89M w/ urinary retention here for pre-op workup for scheduled TURP 10/13. Patient is VSS, HDS, and afebrile.    Plan:  -Diet: Regular  -Pain control  -Monitor Urine output  -DVT ppx: SCD, HSQ  -Enhanced bedside care w/ aid at bedside.  -IVf  -COVID test.

## 2021-10-11 NOTE — H&P ADULT - HISTORY OF PRESENT ILLNESS
Patient is a 89y old  Male who presents with a chief complaint of Urinary retention    HPI:     Patient is a 89M w/ PMH of Dementia, s/p pneumonia, urinary retention s/p borges catheter placement, HLD, HTN.  Patient was direct admitted for pre-op clearance, for scheduled TURP Wed 10/13.    Patient is Alert and oriented x 1 responds to name.  He has been pulling at his Borges catheter, one to one is at bedside.  Can not determine ROS due to mental status.     Vital Signs Last 24 Hrs  T(C): 35.7 (11 Oct 2021 22:36), Max: 35.7 (11 Oct 2021 22:36)  T(F): 96.3 (11 Oct 2021 22:36), Max: 96.3 (11 Oct 2021 22:36)  HR: 66 (11 Oct 2021 20:34) (62 - 66)  BP: 151/83 (11 Oct 2021 20:34) (138/61 - 151/83)  BP(mean): --  RR: 17 (11 Oct 2021 20:34) (16 - 17)  SpO2: 95% (11 Oct 2021 20:34) (95% - 95%)  I&O's Summary      PE:  Gen: Sleeping, appears agitated and grabbing borges catheter. A & O x 1.  Abd: soft nt/nd.  : Borges catheter in place draining clear/red output.   LEEANNE: Deferred

## 2021-10-12 NOTE — PROGRESS NOTE ADULT - SUBJECTIVE AND OBJECTIVE BOX
INTERVAL HPI/OVERNIGHT EVENTS:  No acute events overnight. Patient agitated overnight, he was combative w/ bedside private nurse, and floor nurse.  He was also tugging and pulling at his borges catheter and other tubes.  Mitts were tried but he was able remove them and continue.     VITALS:    T(F): 96.7 (10-12-21 @ 06:00), Max: 97.5 (10-11-21 @ 23:31)  HR: 100 (10-12-21 @ 06:00) (62 - 100)  BP: 148/67 (10-12-21 @ 06:00) (138/61 - 151/83)  RR: 18 (10-12-21 @ 06:00) (16 - 18)  SpO2: 100% (10-12-21 @ 06:00) (95% - 100%)  Wt(kg): --    I&O's Detail      MEDICATIONS:    ANTIBIOTICS:  cefuroxime   Tablet 250 milliGRAM(s) Oral every 12 hours      PAIN CONTROL:  acetaminophen   Tablet .. 650 milliGRAM(s) Oral every 6 hours PRN       MEDS:      HEME/ONC  aspirin  chewable 81 milliGRAM(s) Oral daily  heparin   Injectable 5000 Unit(s) SubCutaneous every 8 hours        PHYSICAL EXAM:  General: No acute distress.  Alert and Oriented  Abdominal Exam: soft nt/nd.     Exam: Borges catheter in place draining pink/clear.       LABS:                        9.5    7.61  )-----------( 252      ( 12 Oct 2021 06:53 )             30.7     10-12    141  |  108  |  28<H>  ----------------------------<  119<H>  4.1   |  24  |  1.39<H>    Ca    9.3      12 Oct 2021 06:53  Phos  3.9     10-12  Mg     1.8     10-12            RADIOLOGY & ADDITIONAL TESTS:    ASSESSMENT: 89yMale w/ urinary retention and UTI for scheduled TURP Wed 10/13.      PLAN:  Diet: Regs.   Pain control  Monitor Urine Output  DVT ppx  Cont Abx  OOB/IS

## 2021-10-12 NOTE — DIETITIAN INITIAL EVALUATION ADULT. - OTHER CALCULATIONS
ABW used for calculations as pt between % of IBW. (118%). Needs adjusted for advanced age, pressure ulcer, and pre/post nutr optimization.

## 2021-10-12 NOTE — DIETITIAN INITIAL EVALUATION ADULT. - ORAL NUTRITION SUPPLEMENTS
Recommend addition of Ensure Enlive BID (700 kcal, 40g protein, 360 mL free H2O). Recommend addition of Zinc sulfate 220mg/day, MVI daily to optimize PU wound healing.

## 2021-10-12 NOTE — DIETITIAN INITIAL EVALUATION ADULT. - OTHER INFO
89M w/ PMH of Dementia (AOx1), s/p pneumonia, urinary retention s/p borges catheter placement, HLD, HTN.  Patient was direct admitted for pre-op clearance, for scheduled TURP Wed 10/13.    On assessment, pt resting in bed comfortably with 24/hr aid at bedside. Pt unable to participate in exam 2/2 AMS. Current aid at bedside noted this is her first official day with pt and unable to provide hx, RD to follow. Currently on soft diet tolerating PO well. Pt consumed 25-50% of breakfast this am. Consumed 25-50% of egg whites of hardboiled eggs, and 100% of fruit. Pt would likely benefit from addition of ONS. No n/v/d/c. No abd distention or discomfort noted. Last BM 10/11. Skin; stg I pressure ulcer left ankle. Claudio score 14. No pain noted at this time. Unable to assess UBW nor appetite PTA. NKFA per EMR. Disc/ recommended 24hr aid to provide constant encouragement for PO and hydration- receptive. Please see nutr recs below. RD to follow.

## 2021-10-12 NOTE — CONSULT NOTE ADULT - SUBJECTIVE AND OBJECTIVE BOX
INTERNAL MEDICINE SERVICE INITIAL CONSULT NOTE    HPI:  Patient is a 89y old  Male who presents with a chief complaint of Urinary retention     Patient is a 89M w/ PMH of Dementia, s/p pneumonia, urinary retention s/p borges catheter placement, HLD, HTN.  Patient was direct admitted for pre-op clearance, for scheduled TURP Wed 10/13.    Patient is Alert and oriented x 1 responds to name.  He has been pulling at his Borges catheter, one to one is at bedside.  Can not determine ROS due to mental status.    (11 Oct 2021 22:55)      ADDITIONAL MEDICINE HPI: Additional HPI obtained from daughter at bedside and outside hospital records in paper chart.    89M w/ PMHx of dementia (A&Ox0) HTN, HLD, CAD w/ stents placed 2013 on aspirin, glaucoma, DJD, COVID pna in 4/2020, recently hospitalized at [facility] for bacterial pna 9/27-10/4/2021, readmitted 10/6 for SANCHEZ from urinary retention, now transferred to [facility] for TURP. Pt is a longterm resident at [address], was noted to have a fever of 102F for which he was initially sent to [facility] on 9/27. Pt improved with antibiotic treatment and was discharged back to NH on 10/4. Back in the nursing home he was noted to not have any urine output for 48hrs, so was readmitted to [facility] on 10/6, was found to have SANCHEZ due to urinary retention. Pt improved with Borges placement however failed TOV with flomax and proscar, and so was transferred to [facility] on 10/12 for TURP scheduled 10/13. Medicine consulted for pre-op and comanagement.     REVIEW OF SYSTEMS:   Unable to obtain ROS due to pt's mentation    PAST MEDICAL HISTORY:   Dementia  HTN  HLD  CAD w/ stents placed 2013  Glaucoma  DJD    PAST SURGICAL HISTORY:  Cardiac stents placed 2013  Appendectomy as a teenager    FAMILY HISTORY:  Noncontributory    SOCIAL HISTORY:  Tobacco use: never smoker  EtOH use: denies  Illicit drug use: denies    MEDICATIONS:  MEDICATIONS  (STANDING):  amLODIPine   Tablet 5 milliGRAM(s) Oral daily  aspirin  chewable 81 milliGRAM(s) Oral daily  atorvastatin 40 milliGRAM(s) Oral at bedtime  calcium carbonate   Suspension 650 milliGRAM(s) Oral two times a day  cefTRIAXone   IVPB 1000 milliGRAM(s) IV Intermittent every 24 hours  cholecalciferol 2000 Unit(s) Oral daily  cyanocobalamin 1000 MICROGram(s) Oral daily  dorzolamide 2% Ophthalmic Solution 1 Drop(s) Left EYE three times a day  heparin   Injectable 5000 Unit(s) SubCutaneous every 8 hours  latanoprost 0.005% Ophthalmic Solution 1 Drop(s) Left EYE at bedtime  pantoprazole    Tablet 40 milliGRAM(s) Oral before breakfast  sodium chloride 0.9%. 1000 milliLiter(s) (50 mL/Hr) IV Continuous <Continuous>    MEDICATIONS  (PRN):  acetaminophen   Tablet .. 650 milliGRAM(s) Oral every 6 hours PRN Temp greater or equal to 38C (100.4F), Mild Pain (1 - 3)      ALLERGIES:  Allergies  penicillin (Rash)    Intolerances  None      VITAL SIGNS:  Vital Signs Last 24 Hrs  T(C): 35.8 (12 Oct 2021 08:57), Max: 36.4 (11 Oct 2021 23:31)  T(F): 96.4 (12 Oct 2021 08:57), Max: 97.5 (11 Oct 2021 23:31)  HR: 84 (12 Oct 2021 08:57) (62 - 100)  BP: 120/73 (12 Oct 2021 08:57) (120/73 - 151/83)  BP(mean): --  RR: 18 (12 Oct 2021 08:57) (16 - 18)  SpO2: 97% (12 Oct 2021 08:57) (95% - 100%)    10-12-21 @ 07:01  -  10-12-21 @ 14:42  --------------------------------------------------------  IN:  Total IN: 0 mL    OUT:    Voided (mL): 400 mL  Total OUT: 400 mL    Total NET: -400 mL      PHYSICAL EXAM:  Constitutional: Elderly thin man, resting comfortably in bed; NAD  Head: NC/AT  Eyes: PERRL, EOMI, anicteric sclera  ENT: no nasal discharge; uvula midline, no oropharyngeal erythema or exudates; dry MM  Neck: supple; no JVD or thyromegaly  Respiratory: CTA B/L; no W/R/R, no retractions, on 2LNC  Cardiac: +S1/S2; RRR; grade 2 systolic murmur loudest at the R 2nd intercostal space  Gastrointestinal: abdomen soft, NT/ND; no rebound or guarding; +BSx4  Genitourinary: normal external genitalia, Borges draining dark yellow urine, trace blood at penile meatus  Extremities: WWP, no clubbing or cyanosis; no peripheral edema  Musculoskeletal: no joint swelling, tenderness or erythema  Vascular: 2+ radial, DP/PT pulses B/L  Dermatologic: skin warm, dry and intact  Lymphatic: no submandibular or cervical LAD  Neurologic: AAOx0, no focal asymmetries  Psychiatric: unable to assess orientation, intermittently verbalizes incomprehensible speech, pulls at lines    LABS:                        9.5    7.61  )-----------( 252      ( 12 Oct 2021 06:53 )             30.7     10-12    141  |  108  |  28<H>  ----------------------------<  119<H>  4.1   |  24  |  1.39<H>    Ca    9.3      12 Oct 2021 06:53  Phos  3.9     10-12  Mg     1.8     10-12      PT/INR - ( 12 Oct 2021 11:21 )   PT: 12.8 sec;   INR: 1.07     PTT - ( 12 Oct 2021 11:21 )  PTT:38.1 sec        RADIOLOGY & ADDITIONAL TESTS: Reviewed.

## 2021-10-12 NOTE — CONSULT NOTE ADULT - ASSESSMENT
89M w/ PMHx of dementia (A&Ox0) HTN, HLD, CAD w/ stents placed 2013 on aspirin, glaucoma, DJD, COVID pna in 4/2020, recently hospitalized at [facility] for bacterial pna 9/27-10/4/2021, readmitted 10/6 for SANCHEZ from urinary retention, now transferred to [facility] for TURP. Medicine consulted for pre-op and comanagement.

## 2021-10-12 NOTE — ADVANCED PRACTICE NURSE CONSULT - RECOMMEDATIONS
Recommend Triad ointment to site every other day, cover with foam dressing, offloading boot for pressure relief. Spoke with LUKE Campoverde and house staff.

## 2021-10-12 NOTE — CONSULT NOTE ADULT - ATTENDING COMMENTS
Initial attending contact date 10/12/21     . See fellow note written above for details. I reviewed the fellow documentation. I have personally seen and examined this patient. I reviewed vitals, labs, medications, cardiac studies, and additional imaging. I agree with the above fellow's findings and plans as written above with the following additions/statements.     -89M w/ PMH of CAD s/p PCI 8 years ago (on ASA), HTN, HLD, Dementia AAO x 0, BPH admitted with SANCHEZ secondary to urinary retention  -With plan for TURP 10/13  -EKG NSR with 1st degree AV block, RBBB (no prior EKG to compare)  -pt essentially wheelchair bound, per family denies CP/SOB  -on exam, euvolemic with systolic murmur suggestive of likely moderate AS  -SBP range 120-150. Hypertensive likely while agitated. Cont amlodipine 5mg qd  -Cont ASA (can be held if needed for OR), statin  -Pt considered intermediate risk for low risk procedure. No need for further cardiac work up at this time  -Will fu post op
Hypothermia resolving  f/u urine c/s and cont antibiotics  Cards consulted for pre op.f/u echo    Thank you for the consult and pls call us back if any further questions/concerns

## 2021-10-12 NOTE — CONSULT NOTE ADULT - PROBLEM SELECTOR RECOMMENDATION 4
Hgb 9.5, stable from Hgb 10.8 on 10/6 from OSH. No active bleed on exam. Iron studies done 10/8 at OSH w/ iron level 50, TIBC 179 (low), iron sat 27%, ferritin 443 (high) consistent with anemia of chronic disease.  - Monitor CBC  - Transfuse for Hgb <7

## 2021-10-12 NOTE — CONSULT NOTE ADULT - SUBJECTIVE AND OBJECTIVE BOX
HPI:     Patient is a 89M w/ PMH of CAD (on ASA), HTN, HLD, Dementia, who directly admitted for scheduled TURP on Wed 10/13. Cardiology consulted for pre-op. On interview, patient does not answer any questions.       #Pre-op   Hx of CAD on ASA. Does not appear to be in acute cardiac event (ACS, decompensated HF).                    (11 Oct 2021 22:55)    PAST MEDICAL & SURGICAL HISTORY:      PREVIOUS DIAGNOSTIC TESTING:    [ ] Echocardiogram:  [ ] Stress Test:  [ ] Catheterization: 	    FAMILY HISTORY:      ALLERGIES/INTOLERANCES:  penicillin (Rash)    HOME MEDICATIONS:    INPATIENT MEDICATIONS:  amLODIPine   Tablet 5 milliGRAM(s) Oral daily    aspirin  chewable 81 milliGRAM(s) Oral daily  heparin   Injectable 5000 Unit(s) SubCutaneous every 8 hours    acetaminophen   Tablet .. 650 milliGRAM(s) Oral every 6 hours PRN  atorvastatin 40 milliGRAM(s) Oral at bedtime  calcium carbonate   Suspension 650 milliGRAM(s) Oral two times a day  cefuroxime   Tablet 250 milliGRAM(s) Oral every 12 hours  cholecalciferol 2000 Unit(s) Oral daily  cyanocobalamin 1000 MICROGram(s) Oral daily  dorzolamide 2% Ophthalmic Solution 1 Drop(s) Left EYE three times a day  latanoprost 0.005% Ophthalmic Solution 1 Drop(s) Left EYE at bedtime  pantoprazole    Tablet 40 milliGRAM(s) Oral before breakfast  sodium chloride 0.9%. 1000 milliLiter(s) IV Continuous <Continuous>      REVIEW OF SYSTEMS: See HPI     PHYSICAL EXAM:  Gen: NAD   HEENT: EOMI   Neck: Flat JVP   Cor: Normal s1, s2. RRR.   Abd: soft, non-tender, non-distended  Ext: no edema  Neuro: alert and attentive    T(C): 36.4 (10-11-21 @ 23:31), Max: 36.4 (10-11-21 @ 23:31)  HR: 66 (10-11-21 @ 20:34) (62 - 66)  BP: 151/83 (10-11-21 @ 20:34) (138/61 - 151/83)  RR: 17 (10-11-21 @ 20:34) (16 - 17)  SpO2: 95% (10-11-21 @ 20:34) (95% - 95%)  Wt(kg): --    I&O's Summary      TELEMETRY: 	      ECG:  	  	  LABS:            Lipid Profile:   HgA1c:   TSH:     CARDIAC MARKERS:          proBNP:     RADIOLOGY:         HPI:     Patient is a 89M w/ PMH of CAD (on ASA), HTN, HLD, Dementia, who directly admitted for scheduled TURP on Wed 10/13. Cardiology consulted for pre-op. On interview, patient does not answer any questions.                      (11 Oct 2021 22:55)    PAST MEDICAL & SURGICAL HISTORY:      PREVIOUS DIAGNOSTIC TESTING:    [ ] Echocardiogram:  [ ] Stress Test:  [ ] Catheterization: 	    FAMILY HISTORY:      ALLERGIES/INTOLERANCES:  penicillin (Rash)    HOME MEDICATIONS:    INPATIENT MEDICATIONS:  amLODIPine   Tablet 5 milliGRAM(s) Oral daily    aspirin  chewable 81 milliGRAM(s) Oral daily  heparin   Injectable 5000 Unit(s) SubCutaneous every 8 hours    acetaminophen   Tablet .. 650 milliGRAM(s) Oral every 6 hours PRN  atorvastatin 40 milliGRAM(s) Oral at bedtime  calcium carbonate   Suspension 650 milliGRAM(s) Oral two times a day  cefuroxime   Tablet 250 milliGRAM(s) Oral every 12 hours  cholecalciferol 2000 Unit(s) Oral daily  cyanocobalamin 1000 MICROGram(s) Oral daily  dorzolamide 2% Ophthalmic Solution 1 Drop(s) Left EYE three times a day  latanoprost 0.005% Ophthalmic Solution 1 Drop(s) Left EYE at bedtime  pantoprazole    Tablet 40 milliGRAM(s) Oral before breakfast  sodium chloride 0.9%. 1000 milliLiter(s) IV Continuous <Continuous>      REVIEW OF SYSTEMS: See HPI     PHYSICAL EXAM:  Gen: NAD   HEENT: EOMI   Neck: Flat JVP   Cor: Normal s1, s2. RRR.   Abd: soft, non-tender, non-distended  Ext: no edema  Neuro: alert and attentive    T(C): 36.4 (10-11-21 @ 23:31), Max: 36.4 (10-11-21 @ 23:31)  HR: 66 (10-11-21 @ 20:34) (62 - 66)  BP: 151/83 (10-11-21 @ 20:34) (138/61 - 151/83)  RR: 17 (10-11-21 @ 20:34) (16 - 17)  SpO2: 95% (10-11-21 @ 20:34) (95% - 95%)  Wt(kg): --    I&O's Summary      TELEMETRY: 	      ECG:  	  	  LABS:            Lipid Profile:   HgA1c:   TSH:     CARDIAC MARKERS:          proBNP:     RADIOLOGY:         HPI:     Patient is a 89M w/ PMH of CAD (on ASA), HTN, HLD, Dementia, who directly admitted for scheduled TURP on Wed 10/13. Cardiology consulted for pre-op. On interview, patient does not answer any questions.      (11 Oct 2021 22:55)    PAST MEDICAL & SURGICAL HISTORY:      PREVIOUS DIAGNOSTIC TESTING:        FAMILY HISTORY:      ALLERGIES/INTOLERANCES:  penicillin (Rash)    HOME MEDICATIONS:    INPATIENT MEDICATIONS:  amLODIPine   Tablet 5 milliGRAM(s) Oral daily    aspirin  chewable 81 milliGRAM(s) Oral daily  heparin   Injectable 5000 Unit(s) SubCutaneous every 8 hours    acetaminophen   Tablet .. 650 milliGRAM(s) Oral every 6 hours PRN  atorvastatin 40 milliGRAM(s) Oral at bedtime  calcium carbonate   Suspension 650 milliGRAM(s) Oral two times a day  cefuroxime   Tablet 250 milliGRAM(s) Oral every 12 hours  cholecalciferol 2000 Unit(s) Oral daily  cyanocobalamin 1000 MICROGram(s) Oral daily  dorzolamide 2% Ophthalmic Solution 1 Drop(s) Left EYE three times a day  latanoprost 0.005% Ophthalmic Solution 1 Drop(s) Left EYE at bedtime  pantoprazole    Tablet 40 milliGRAM(s) Oral before breakfast  sodium chloride 0.9%. 1000 milliLiter(s) IV Continuous <Continuous>      REVIEW OF SYSTEMS: See HPI     PHYSICAL EXAM:  Gen: NAD   Cor: Normal s1, s2. RRR.   Abd: soft, non-tender, non-distended  Ext: no edema    T(C): 36.4 (10-11-21 @ 23:31), Max: 36.4 (10-11-21 @ 23:31)  HR: 66 (10-11-21 @ 20:34) (62 - 66)  BP: 151/83 (10-11-21 @ 20:34) (138/61 - 151/83)  RR: 17 (10-11-21 @ 20:34) (16 - 17)  SpO2: 95% (10-11-21 @ 20:34) (95% - 95%)  Wt(kg): --    I&O's Summary    ECG:  	normal sinus, IVCD

## 2021-10-12 NOTE — CONSULT NOTE ADULT - PROBLEM SELECTOR RECOMMENDATION 5
H/o CAD w/ stents placed 2013 at [facility]. H/o CAD w/ stents placed 2013 at [facility].  - Recommend holding pre-operatively, restart post-op as tolerated

## 2021-10-12 NOTE — CONSULT NOTE ADULT - ASSESSMENT
Patient is a 89M w/ PMH of CAD (on ASA), HTN, HLD, Dementia, who directly admitted for scheduled TURP on Wed 10/13. Cardiology consulted for pre-op.    #Pre-op   Hx of CAD on ASA. Does not appear to be in acute cardiac event (ACS, decompensated HF). RCRI score of 1. 6% 30-day risk of death, MI, or cardiac arrest. Larson 0.3%. Unable to assess Mets, likely <4. Patient is a low to intermediate risk for a low risk procedure.   - c/w home ASA, amlodipine and atorvastatin; can hold home ASA the day of procedure   - official TTE in AM    Recs not final until attested by cardiology attending      Patient is a 89M w/ PMH of CAD s/p PCI 8 years ago (on ASA), HTN, HLD, Dementia, who directly admitted for scheduled TURP on Wed 10/13. Cardiology consulted for pre-op.    #Pre-op   Hx of CAD on ASA. Does not appear to be in acute cardiac event (ACS, decompensated HF). RCRI score of 1. 6% 30-day risk of death, MI, or cardiac arrest. Larson 0.3%. Unable to assess Mets, likely <4. Patient is a low to intermediate risk for a low risk procedure.   - c/w home ASA, amlodipine and atorvastatin; can hold home ASA the day of procedure       Recs not final until attested by cardiology attending

## 2021-10-12 NOTE — CONSULT NOTE ADULT - PROBLEM SELECTOR RECOMMENDATION 9
Presenting as a transfer from outside hospital with rectal temp 92.3F now improved to euthermia. Improved. Presenting as a transfer from outside hospital with rectal temp 92.3F now improved to euthermia. No other s/s of systemic infection. Possibly due to environment during ambulance transfer from outside hospital. Recent hospital records have blood cultures from 9/27 and 10/6 NGTD, and urine cultures 9/28 and 10/6 NGTD. UA from 10/6 with blood, trace LE and bacteria.  - C/w empiric antibiotics per primary team for bacturia prior to urologic procedure  - Continue to monitor for s/s of sepsis  - Jeremy nixon prn for hypothermia Improved. Presenting as a transfer from outside hospital with rectal temp 92.3F now improved to euthermia. No other s/s of systemic infection. Possibly due to environment during ambulance transfer from outside hospital. Recent hospital records have blood cultures from 9/27 and 10/6 NGTD, and urine cultures 9/28 and 10/6 NGTD. UA from 10/6 with blood, trace LE and bacteria.  - C/w empiric antibiotics per primary team for bacturia prior to urologic procedure  - Recommend repeat blood cultures x 2 sets and urine culture  - Continue to monitor for s/s of sepsis  - Jeremy nixon prn for hypothermia RCRI score of 1 (6% 30-day risk of death, MI, or cardiac arrest). Larson score 0.3%. Unable to assess Mets as pt is chronically wheelchair-bound. Patient is intermediate risk for a low risk procedure.  - Medically optimized, no recommendations for additional pre-op testing

## 2021-10-12 NOTE — ADVANCED PRACTICE NURSE CONSULT - ASSESSMENT
Patient is a 89y old  Male who presents with a chief complaint of Urinary retention, plan for TURP 10/13. Pt admitted with stage 3 pressure injury on medial aspect of left heel measuring 1.3 x 1.5 x 0.2 cm with pink wound bed. Triad ointment applied over site then covered with foam dressing, offloading boots bilaterally. Spoke with pt's paid cg to reinforce keeping boots on to prevent pressure to site.

## 2021-10-12 NOTE — CONSULT NOTE ADULT - PROBLEM SELECTOR RECOMMENDATION 8
H/o dementia A&Ox0. Pt was started on clonazepam 1mg PO TID prn and olanzapine 5mg PO qHS at OSH.  - Discourage use of any benzos in the elderly  - Can continue olanzapine 5mg PO qHS  - C/w home vitamin B12 daily      # Glaucoma  - C/w home eye drops: dorzolamide 2% 1 drop L eye BID, latanoprost 0.005% 1 drop L eye qHS H/o dementia A&Ox0. Pt was started on clonazepam 1mg PO TID prn and olanzapine 5mg PO qHS at OSH.  - Discourage use of any benzos in the elderly  - Can continue olanzapine 5mg PO qHS prn for behavioral disturbance  - C/w home vitamin B12 daily  - Med rec with desvenlafaxine 25mg daily, please obtain collateral regarding recent use as a taper is recommended for discontinuation      # Glaucoma  - C/w home eye drops: dorzolamide 2% 1 drop L eye BID, latanoprost 0.005% 1 drop L eye qHS

## 2021-10-12 NOTE — CONSULT NOTE ADULT - PROBLEM SELECTOR RECOMMENDATION 2
Improving. Presented to OSH on 10/6 for no urine output x 2 days, found to have creatinine 2.2 and BUN 50. S/p Chairez however failed TOV with Flomax and Proscar, now transferred to [facility] for TURP. Likely 2/2 postobstruction from enlarged prostate on ultrasound done at OSH.  - Trend daily BMP  - Renally dose all medications for reduced CrCl  - Chairez management per primary team  - Scheduled for TURP on 10/13

## 2021-10-12 NOTE — CONSULT NOTE ADULT - PROBLEM SELECTOR RECOMMENDATION 3
Grade 2 systolic murmur on exam. Daughter at bedside reports longstanding h/o cardiac murmur. No prior echo in EMR.  - Agree with cardiology team, recommend TTE to assess for severity of valvular dysfunction and need for cardiac anesthesia Grade 2 systolic murmur on exam. Daughter at bedside reports longstanding h/o cardiac murmur. No prior echo in EMR.  - Agree with cardiology team, recommend TTE to assess for valvular dysfunction

## 2021-10-13 NOTE — PROGRESS NOTE ADULT - SUBJECTIVE AND OBJECTIVE BOX
INTERVAL HPI/OVERNIGHT EVENTS:  No acute events overnight.    VITALS:    T(F): 96.7 (10-12-21 @ 23:00), Max: 96.7 (10-12-21 @ 06:00)  HR: 58 (10-12-21 @ 21:00) (58 - 100)  BP: 121/56 (10-12-21 @ 21:00) (120/73 - 148/67)  RR: 16 (10-12-21 @ 21:00) (16 - 18)  SpO2: 100% (10-12-21 @ 21:00) (97% - 100%)  Wt(kg): --    I&O's Detail    12 Oct 2021 07:01  -  13 Oct 2021 05:53  --------------------------------------------------------  IN:  Total IN: 0 mL    OUT:    Indwelling Catheter - Urethral (mL): 620 mL  Total OUT: 620 mL    Total NET: -620 mL          MEDICATIONS:    ANTIBIOTICS:  cefTRIAXone   IVPB 1000 milliGRAM(s) IV Intermittent every 24 hours      PAIN CONTROL:  acetaminophen   Tablet .. 650 milliGRAM(s) Oral every 6 hours PRN  ondansetron Injectable 4 milliGRAM(s) IV Push every 4 hours PRN       MEDS:      HEME/ONC  aspirin  chewable 81 milliGRAM(s) Oral daily  heparin   Injectable 5000 Unit(s) SubCutaneous every 8 hours        PHYSICAL EXAM:  General: No acute distress.  Alert and Oriented  Abdominal Exam: soft, NT, ND   Exam: FC intact, urine clear      LABS:                        9.5    7.61  )-----------( 252      ( 12 Oct 2021 06:53 )             30.7     10-12    141  |  108  |  28<H>  ----------------------------<  119<H>  4.1   |  24  |  1.39<H>    Ca    9.3      12 Oct 2021 06:53  Phos  3.9     10-12  Mg     1.8     10-12      PT/INR - ( 12 Oct 2021 11:21 )   PT: 12.8 sec;   INR: 1.07          PTT - ( 12 Oct 2021 11:21 )  PTT:38.1 sec      RADIOLOGY & ADDITIONAL TESTS:

## 2021-10-13 NOTE — BRIEF OPERATIVE NOTE - NSICDXBRIEFPROCEDURE_GEN_ALL_CORE_FT
PROCEDURES:  Cystoscopy, with photoselective vaporization of prostate using 532nm greenlight laser 13-Oct-2021 15:32:02  Luis Carlos Lyn

## 2021-10-13 NOTE — PROGRESS NOTE ADULT - SUBJECTIVE AND OBJECTIVE BOX
MARITZA WEAVER  89y  Male      Patient is a 89y old  Male who presents with a chief complaint of Pre-op workup (13 Oct 2021 05:53)      INTERVAL HPI/OVERNIGHT EVENTS:        REVIEW OF SYSTEMS:  CONSTITUTIONAL: No fever, weight loss, or fatigue  EYES: No eye pain, visual disturbances, or discharge  ENMT:  No difficulty hearing, tinnitus, vertigo; No sinus or throat pain  NECK: No pain or stiffness  BREASTS: No pain, masses, or nipple discharge  RESPIRATORY: No cough, wheezing, chills or hemoptysis; No shortness of breath  CARDIOVASCULAR: No chest pain, palpitations, dizziness, or leg swelling  GASTROINTESTINAL: No abdominal or epigastric pain. No nausea, vomiting, or hematemesis; No diarrhea or constipation. No melena or hematochezia.  GENITOURINARY: No dysuria, frequency, hematuria, or incontinence  NEUROLOGICAL: No headaches, memory loss, loss of strength, numbness, or tremors  SKIN: No itching, burning, rashes, or lesions   LYMPH NODES: No enlarged glands  ENDOCRINE: No heat or cold intolerance; No hair loss  MUSCULOSKELETAL: No joint pain or swelling; No muscle, back, or extremity pain  PSYCHIATRIC: No depression, anxiety, mood swings, or difficulty sleeping  HEME/LYMPH: No easy bruising, or bleeding gums  ALLERY AND IMMUNOLOGIC: No hives or eczema    T(C): 35.8 (10-13-21 @ 08:53), Max: 36.1 (10-13-21 @ 06:40)  HR: 78 (10-13-21 @ 08:53) (58 - 92)  BP: 117/70 (10-13-21 @ 08:53) (117/70 - 162/71)  RR: 18 (10-13-21 @ 08:53) (16 - 18)  SpO2: 99% (10-13-21 @ 08:53) (98% - 100%)  Wt(kg): --Vital Signs Last 24 Hrs  T(C): 35.8 (13 Oct 2021 08:53), Max: 36.1 (13 Oct 2021 06:40)  T(F): 96.4 (13 Oct 2021 08:53), Max: 96.9 (13 Oct 2021 06:40)  HR: 78 (13 Oct 2021 08:53) (58 - 92)  BP: 117/70 (13 Oct 2021 08:53) (117/70 - 162/71)  BP(mean): --  RR: 18 (13 Oct 2021 08:53) (16 - 18)  SpO2: 99% (13 Oct 2021 08:53) (98% - 100%)    PHYSICAL EXAM:  GENERAL: NAD, well-groomed, well-developed  HEAD:  Atraumatic, Normocephalic  EYES: EOMI, PERRLA, conjunctiva and sclera clear  ENMT: No tonsillar erythema, exudates, or enlargement; Moist mucous membranes, Good dentition, No lesions  NECK: Supple, No JVD, Normal thyroid  NERVOUS SYSTEM:  Alert & Oriented X3, Good concentration; Motor Strength 5/5 B/L upper and lower extremities; DTRs 2+ intact and symmetric  CHEST/LUNG: Clear to percussion bilaterally; No rales, rhonchi, wheezing, or rubs  HEART: Regular rate and rhythm; No murmurs, rubs, or gallops  ABDOMEN: Soft, Nontender, Nondistended; Bowel sounds present  EXTREMITIES:  2+ Peripheral Pulses, No clubbing, cyanosis, or edema  LYMPH: No lymphadenopathy noted  SKIN: No rashes or lesions    Consultant(s) Notes Reviewed:  [x ] YES  [ ] NO  Care Discussed with Consultants/Other Providers [ x] YES  [ ] NO    LABS:                        10.1   5.56  )-----------( 238      ( 13 Oct 2021 07:56 )             33.7     10-13    142  |  105  |  27<H>  ----------------------------<  88  4.2   |  25  |  1.40<H>    Ca    9.4      13 Oct 2021 07:56  Phos  3.6     10-13  Mg     2.0     10-13      PT/INR - ( 12 Oct 2021 11:21 )   PT: 12.8 sec;   INR: 1.07          PTT - ( 12 Oct 2021 11:21 )  PTT:38.1 sec    CAPILLARY BLOOD GLUCOSE                RADIOLOGY & ADDITIONAL TESTS:    Imaging Personally Reviewed:  [ ] YES  [ ] NO    HEALTH ISSUES - PROBLEM Dx:  Hypothermia, endogenous    History of cardiac murmur    Anemia    SANCHEZ (acute kidney injury)    CAD (coronary artery disease)    HTN (hypertension)    HLD (hyperlipidemia)    Dementia    Preoperative examination    Urinary retention         MARITZA WEAVER  89y  Male    SUBJECTIVE: Patient seen and examined at bedside. He appears comfortable and in no acute distress, asleep with bilateral wrist restraints on. The aide at bedside reports he was agitated all night and was restrained. He is able to be aroused but cannot communicate his symptoms given his baseline mental status,    T(C): 35.8 (10-13-21 @ 08:53), Max: 36.1 (10-13-21 @ 06:40)  HR: 78 (10-13-21 @ 08:53) (58 - 92)  BP: 117/70 (10-13-21 @ 08:53) (117/70 - 162/71)  RR: 18 (10-13-21 @ 08:53) (16 - 18)  SpO2: 99% (10-13-21 @ 08:53) (98% - 100%)  Wt(kg): --Vital Signs Last 24 Hrs  T(C): 35.8 (13 Oct 2021 08:53), Max: 36.1 (13 Oct 2021 06:40)  T(F): 96.4 (13 Oct 2021 08:53), Max: 96.9 (13 Oct 2021 06:40)  HR: 78 (13 Oct 2021 08:53) (58 - 92)  BP: 117/70 (13 Oct 2021 08:53) (117/70 - 162/71)  BP(mean): --  RR: 18 (13 Oct 2021 08:53) (16 - 18)  SpO2: 99% (13 Oct 2021 08:53) (98% - 100%)    PHYSICAL EXAM:  GENERAL: frail elderly male, NAD  HEAD:  Atraumatic, Normocephalic  EYES: EOMI, PERRLA, conjunctiva and sclera clear  ENMT: No tonsillar erythema, exudates, or enlargement; Moist mucous membranes, Good dentition, No lesions  NECK: Supple, No JVD, Normal thyroid  NERVOUS SYSTEM:  Alert & Oriented X0, strength and sensation cannot be assessed given baseline mental status due to dementia.  CHEST/LUNG: Clear to percussion bilaterally; No rales, rhonchi, wheezing, or rubs  HEART: Regular rate and rhythm; +grade 2 systolic murmur, no rubs, or gallops  ABDOMEN: Soft, Nontender, Nondistended; Bowel sounds present  EXTREMITIES:  2+ Peripheral Pulses, No clubbing, cyanosis, or edema  LYMPH: No lymphadenopathy noted  SKIN: No rashes or lesions    Consultant(s) Notes Reviewed:  [x ] YES  [ ] NO  Care Discussed with Consultants/Other Providers [ x] YES  [ ] NO    LABS:                        10.1   5.56  )-----------( 238      ( 13 Oct 2021 07:56 )             33.7     10-13    142  |  105  |  27<H>  ----------------------------<  88  4.2   |  25  |  1.40<H>    Ca    9.4      13 Oct 2021 07:56  Phos  3.6     10-13  Mg     2.0     10-13      PT/INR - ( 12 Oct 2021 11:21 )   PT: 12.8 sec;   INR: 1.07          PTT - ( 12 Oct 2021 11:21 )  PTT:38.1 sec    CAPILLARY BLOOD GLUCOSE                RADIOLOGY & ADDITIONAL TESTS:    Imaging Personally Reviewed:  [ ] YES  [ ] NO    HEALTH ISSUES - PROBLEM Dx:  Hypothermia, endogenous    History of cardiac murmur    Anemia    SANCHEZ (acute kidney injury)    CAD (coronary artery disease)    HTN (hypertension)    HLD (hyperlipidemia)    Dementia    Preoperative examination    Urinary retention

## 2021-10-13 NOTE — PROGRESS NOTE ADULT - SUBJECTIVE AND OBJECTIVE BOX
OVERNIGHT EVENTS:    SUBJECTIVE / INTERVAL HPI: Patient seen and examined at bedside.     VITAL SIGNS:  Vital Signs Last 24 Hrs  T(C): 35.8 (13 Oct 2021 08:53), Max: 36.1 (13 Oct 2021 06:40)  T(F): 96.4 (13 Oct 2021 08:53), Max: 96.9 (13 Oct 2021 06:40)  HR: 78 (13 Oct 2021 08:53) (58 - 92)  BP: 117/70 (13 Oct 2021 08:53) (117/70 - 162/71)  BP(mean): --  RR: 18 (13 Oct 2021 08:53) (16 - 18)  SpO2: 99% (13 Oct 2021 08:53) (98% - 100%)    PHYSICAL EXAM:    General: Well developed, well nourished, no acute distress  HEENT: NC/AT; PERRL, anicteric sclera; MMM  Neck: supple  Cardiovascular: +S1/S2, RRR, no murmurs, rubs, gallops  Respiratory: CTA B/L; no W/R/R  Gastrointestinal: soft, NT/ND; +BSx4  Extremities: WWP; no edema, clubbing or cyanosis  Vascular: 2+ radial, DP/PT pulses B/L  Neurological: AAOx3; no focal deficits    MEDICATIONS:  MEDICATIONS  (STANDING):  amLODIPine   Tablet 5 milliGRAM(s) Oral daily  aspirin  chewable 81 milliGRAM(s) Oral daily  atorvastatin 40 milliGRAM(s) Oral at bedtime  calcium carbonate   Suspension 650 milliGRAM(s) Oral two times a day  cefTRIAXone   IVPB 1000 milliGRAM(s) IV Intermittent every 24 hours  cholecalciferol 2000 Unit(s) Oral daily  cyanocobalamin 1000 MICROGram(s) Oral daily  dorzolamide 2% Ophthalmic Solution 1 Drop(s) Left EYE three times a day  heparin   Injectable 5000 Unit(s) SubCutaneous every 8 hours  lactated ringers. 1000 milliLiter(s) (80 mL/Hr) IV Continuous <Continuous>  latanoprost 0.005% Ophthalmic Solution 1 Drop(s) Left EYE at bedtime  pantoprazole    Tablet 40 milliGRAM(s) Oral before breakfast  sodium chloride 0.9%. 1000 milliLiter(s) (50 mL/Hr) IV Continuous <Continuous>    MEDICATIONS  (PRN):  acetaminophen   Tablet .. 650 milliGRAM(s) Oral every 6 hours PRN Temp greater or equal to 38C (100.4F), Mild Pain (1 - 3)  ondansetron Injectable 4 milliGRAM(s) IV Push every 4 hours PRN Nausea and/or Vomiting      ALLERGIES:  Allergies    penicillin (Rash)    Intolerances        LABS:                        10.1   5.56  )-----------( 238      ( 13 Oct 2021 07:56 )             33.7     10-13    142  |  105  |  27<H>  ----------------------------<  88  4.2   |  25  |  1.40<H>    Ca    9.4      13 Oct 2021 07:56  Phos  3.6     10-13  Mg     2.0     10-13      PT/INR - ( 12 Oct 2021 11:21 )   PT: 12.8 sec;   INR: 1.07          PTT - ( 12 Oct 2021 11:21 )  PTT:38.1 sec    CAPILLARY BLOOD GLUCOSE          RADIOLOGY & ADDITIONAL TESTS: Reviewed.    PLAN:

## 2021-10-13 NOTE — PACU DISCHARGE NOTE - COMMENTS
Patient met PACU criteria, vss, borges to bedside drainage with CBI with clear yellow urine, safety maintained, report endorsed to 8 Morris Correa

## 2021-10-13 NOTE — PROGRESS NOTE ADULT - SUBJECTIVE AND OBJECTIVE BOX
UROLOGY POST OP NOTE (PAGER # [phone])    PROCEDURE: s/p Greenlight TURP.    Patient seen at bedside, at baseline alert and oriented x 1 (name). Unable to assess chest pain, sob, or n/v.      T(C): 36.6 (10-13-21 @ 17:00), Max: 36.6 (10-13-21 @ 17:00)  HR: 69 (10-13-21 @ 17:30) (58 - 92)  BP: 130/67 (10-13-21 @ 17:30) (117/70 - 162/71)  RR: 12 (10-13-21 @ 17:45) (12 - 18)  SpO2: 100% (10-13-21 @ 17:45) (96% - 100%)  Wt(kg): --    ON PE:    Abdomen: soft nt/nd.     : Chairez catheter in place draining yellow/clear.                           10.1   5.56  )-----------( 238      ( 13 Oct 2021 07:56 )             33.7     10-13    142  |  105  |  27<H>  ----------------------------<  88  4.2   |  25  |  1.40<H>    Ca    9.4      13 Oct 2021 07:56  Phos  3.6     10-13  Mg     2.0     10-13        A/P:

## 2021-10-13 NOTE — BRIEF OPERATIVE NOTE - COMMENTS
Patient given spinal anesthesia and prepped w/ aseptic technique. Patient was placed in modified lithotomy position w/ annette stirrups. Bladder entered with cystoscope, enlarged prostate noted. Greenlight laser vaporization performed with hemostasis achieved. Minimal blood loss. Patient given spinal anesthesia and prepped w/ aseptic technique. Patient was placed in modified lithotomy position w/ annette stirrups. Bladder entered with cystoscope, enlarged prostate noted. Greenlight laser vaporization performed with hemostasis achieved. Minimal blood loss. Urine culture sent. 20 Fr borges 3 way placed and CBI started.

## 2021-10-14 NOTE — PHYSICAL THERAPY INITIAL EVALUATION ADULT - ADDITIONAL COMMENTS
Unable to obtain social history and PLOF from patient as patient is A&Ox1 however per discussion with patient's daughter (Nuha Lilly, [phone]): patient was previously ambulatory prior to February 2020. Since COVID pandemic, family has not been able to observe patient's functional status although daughter reports she knows he's primarily assisted to wheelchair for all mobility and ambulates only with physical therapy.

## 2021-10-14 NOTE — PROGRESS NOTE ADULT - SUBJECTIVE AND OBJECTIVE BOX
INTERVAL HPI/OVERNIGHT EVENTS:  No acute events overnight. Overnight patient remained hypothermic despite marck hugger and blankets. CBI was reduced. Patient's temperature increased appropriately.   Wrist restraints ordered as patient was trying to pull out borges and IV.     VITALS:    T(F): 99.5 (10-14-21 @ 04:36), Max: 99.5 (10-14-21 @ 04:36)  HR: 100 (10-14-21 @ 04:36) (65 - 100)  BP: 135/67 (10-14-21 @ 04:36) (117/70 - 162/71)  RR: 16 (10-14-21 @ 04:36) (12 - 18)  SpO2: 93% (10-14-21 @ 04:36) (93% - 100%)  Wt(kg): --    I&O's Detail    12 Oct 2021 07:01  -  13 Oct 2021 07:00  --------------------------------------------------------  IN:  Total IN: 0 mL    OUT:    Indwelling Catheter - Urethral (mL): 620 mL  Total OUT: 620 mL    Total NET: -620 mL      13 Oct 2021 07:01  -  14 Oct 2021 05:09  --------------------------------------------------------  IN:    Oral Fluid: 200 mL    sodium chloride 0.9%: 160 mL  Total IN: 360 mL    OUT:    Indwelling Catheter - Urethral (mL): 200 mL  Total OUT: 200 mL    Total NET: 160 mL          MEDICATIONS:    ANTIBIOTICS:  cefTRIAXone   IVPB 1000 milliGRAM(s) IV Intermittent every 24 hours      PAIN CONTROL:  acetaminophen   Tablet .. 650 milliGRAM(s) Oral every 6 hours PRN  ondansetron Injectable 4 milliGRAM(s) IV Push every 4 hours PRN       MEDS:      HEME/ONC  aspirin  chewable 81 milliGRAM(s) Oral daily  heparin   Injectable 5000 Unit(s) SubCutaneous every 8 hours        PHYSICAL EXAM:  General: No acute distress.  Alert and Oriented x0, wrist restraints in place   Abdominal Exam: soft, non-tender, non-distended    Exam: borges in place draining red urine. no clots noted.       LABS:                        10.1   5.56  )-----------( 238      ( 13 Oct 2021 07:56 )             33.7     10-13    142  |  105  |  27<H>  ----------------------------<  88  4.2   |  25  |  1.40<H>    Ca    9.4      13 Oct 2021 07:56  Phos  3.6     10-13  Mg     2.0     10-13      PT/INR - ( 12 Oct 2021 11:21 )   PT: 12.8 sec;   INR: 1.07          PTT - ( 12 Oct 2021 11:21 )  PTT:38.1 sec

## 2021-10-14 NOTE — PHYSICAL THERAPY INITIAL EVALUATION ADULT - PATIENT/FAMILY AGREES WITH PLAN
Discussed with patient's daughter (Nuha Lilly, [phone]): family is not amenable to Subacute Rehab at this time, discussed at length with daughter provided 2 person assist for all functional mobility upon discharge from [facility]/yes

## 2021-10-14 NOTE — PHYSICAL THERAPY INITIAL EVALUATION ADULT - PERTINENT HX OF CURRENT PROBLEM, REHAB EVAL
Patient is a 89M w/ PMH of Dementia, s/p pneumonia, urinary retention s/p borges catheter placement, HLD, HTN.  Patient was direct admitted for pre-op clearance, for scheduled TURP Wed 10/13. Please refer to H&P on [place] for remaining.

## 2021-10-14 NOTE — PHYSICAL THERAPY INITIAL EVALUATION ADULT - THERAPY FREQUENCY, PT EVAL
Daughter (Nuha) educated on frequency of inpatient physical therapy at [facility], daughter verbalized understanding./2-3x/week

## 2021-10-14 NOTE — PROGRESS NOTE ADULT - SUBJECTIVE AND OBJECTIVE BOX
Patient is a 89y old  Male who presents with a chief complaint of Pre-op workup (14 Oct 2021 10:14)      HPI:  Patient is a 89y old  Male who presents with a chief complaint of Urinary retention    HPI:     Patient is a 89M w/ PMH of Dementia, s/p pneumonia, urinary retention s/p borges catheter placement, HLD, HTN.  s/p  TURP Wed 10/13.    Patient is Alert and oriented x 1 responds to name    Vital Signs Last 24 Hrs  T(C): 35.7 (11 Oct 2021 22:36), Max: 35.7 (11 Oct 2021 22:36)  T(F): 96.3 (11 Oct 2021 22:36), Max: 96.3 (11 Oct 2021 22:36)  HR: 66 (11 Oct 2021 20:34) (62 - 66)  BP: 151/83 (11 Oct 2021 20:34) (138/61 - 151/83)  BP(mean): --  RR: 17 (11 Oct 2021 20:34) (16 - 17)  SpO2: 95% (11 Oct 2021 20:34) (95% - 95%)  I&O's Summary      PE:  Gen: Sleeping, appears agitated and grabbing borges catheter. A & O x 1.  Abd: soft nt/nd.  : Borges catheter in place draining clear/red output.   LEEANNE: Deferred                  (11 Oct 2021 22:55)      PAST MEDICAL & SURGICAL HISTORY:      SOCIAL HISTORY:  - Smoking:  - Alcohol:  - Recreational drug use:  - Other:    FAMILY HISTORY:      Allergies    penicillin (Rash)    Intolerances        MEDICATIONS  (STANDING):  amLODIPine   Tablet 5 milliGRAM(s) Oral daily  aspirin  chewable 81 milliGRAM(s) Oral daily  atorvastatin 40 milliGRAM(s) Oral at bedtime  calcium carbonate   Suspension 650 milliGRAM(s) Oral two times a day  cefTRIAXone   IVPB 1000 milliGRAM(s) IV Intermittent every 24 hours  cholecalciferol 2000 Unit(s) Oral daily  cyanocobalamin 1000 MICROGram(s) Oral daily  dorzolamide 2% Ophthalmic Solution 1 Drop(s) Left EYE three times a day  heparin   Injectable 5000 Unit(s) SubCutaneous every 8 hours  latanoprost 0.005% Ophthalmic Solution 1 Drop(s) Left EYE at bedtime  pantoprazole    Tablet 40 milliGRAM(s) Oral before breakfast  sodium chloride 0.9%. 1000 milliLiter(s) (80 mL/Hr) IV Continuous <Continuous>    MEDICATIONS  (PRN):  acetaminophen   Tablet .. 650 milliGRAM(s) Oral every 6 hours PRN Temp greater or equal to 38C (100.4F), Mild Pain (1 - 3)  ondansetron Injectable 4 milliGRAM(s) IV Push every 4 hours PRN Nausea and/or Vomiting      REVIEW OF SYSTEMS:  12 point ROS negative except for that stated in the HPI    PHYSICAL EXAM:  Vitals past 24 Hours: T(C): 36.7 (10-14-21 @ 09:11), Max: 37.5 (10-14-21 @ 04:36)  HR: 92 (10-14-21 @ 09:11) (65 - 100)  BP: 140/76 (10-14-21 @ 09:11) (119/61 - 148/72)  RR: 18 (10-14-21 @ 09:11) (12 - 18)  SpO2: 94% (10-14-21 @ 09:11) (93% - 100%)	    Daily     Daily     GEN: Alert and awake, no acute distress  HEENT: Moist mucous membranes  Neck: No JVD  Cardiovascular: Regular rate and rhythm, +S1 S2. No murmurs, rubs, or gallops appreciated  Respiratory: Lungs clear to auscultation bilaterally  Gastrointestinal:  Soft, non-tender, non-distended, normoactive bowel sounds  Skin: No rashes, No ecchymoses, No cyanosis  Neurologic: Non-focal, alert and oriented x3.   Extremities: No clubbing, cyanosis or edema. Warm, well-perfused extremities.   Vascular: Radial and dorsalis pedis pulses 2+ bilaterally    I&O's Detail    13 Oct 2021 07:01  -  14 Oct 2021 07:00  --------------------------------------------------------  IN:    Oral Fluid: 200 mL    sodium chloride 0.9%: 1040 mL  Total IN: 1240 mL    OUT:    Indwelling Catheter - Urethral (mL): 200 mL  Total OUT: 200 mL    Total NET: 1040 mL            LABS:                        9.4    6.76  )-----------( 218      ( 14 Oct 2021 07:52 )             29.5     10-14    140  |  104  |  20  ----------------------------<  113<H>  4.2   |  25  |  1.14    Ca    9.0      14 Oct 2021 07:52  Phos  2.9     10-14  Mg     1.6     10-14              I&O's Summary    13 Oct 2021 07:01  -  14 Oct 2021 07:00  --------------------------------------------------------  IN: 1240 mL / OUT: 200 mL / NET: 1040 mL        CARDIAC DIAGNOSTIC TESTING:    ECG:    TELEMETRY:    ECHO:      RADIOLOGY & ADDITIONAL STUDIES:      ASSESSMENT/PLAN:

## 2021-10-14 NOTE — PHYSICAL THERAPY INITIAL EVALUATION ADULT - PHYSICAL ASSIST/NONPHYSICAL ASSIST: SIT/SUPINE, REHAB EVAL
decreased eccentric trunk control; *increased assist for B/L LEs onto bed surface/verbal cues/nonverbal cues (demo/gestures)/1 person assist

## 2021-10-14 NOTE — PROGRESS NOTE ADULT - SUBJECTIVE AND OBJECTIVE BOX
OVERNIGHT EVENTS: Hypothermic to 89F rectal resolved with Jeremy hugger and reducing CBI. Wrist restraints placed for sundowning. CBI stopped this AM.    SUBJECTIVE / INTERVAL HPI: Patient seen and examined at bedside. Pt awake and alert, calm, denies any pain. Intermittently oriented to self only. Unable to obtain rest of ROS due to baseline dementia.    MEDICATIONS  (STANDING):  amLODIPine   Tablet 5 milliGRAM(s) Oral daily  aspirin  chewable 81 milliGRAM(s) Oral daily  atorvastatin 40 milliGRAM(s) Oral at bedtime  calcium carbonate   Suspension 650 milliGRAM(s) Oral two times a day  cefTRIAXone   IVPB 1000 milliGRAM(s) IV Intermittent every 24 hours  cholecalciferol 2000 Unit(s) Oral daily  cyanocobalamin 1000 MICROGram(s) Oral daily  dorzolamide 2% Ophthalmic Solution 1 Drop(s) Left EYE three times a day  heparin   Injectable 5000 Unit(s) SubCutaneous every 8 hours  latanoprost 0.005% Ophthalmic Solution 1 Drop(s) Left EYE at bedtime  pantoprazole    Tablet 40 milliGRAM(s) Oral before breakfast  sodium chloride 0.9%. 1000 milliLiter(s) (80 mL/Hr) IV Continuous <Continuous>  tamsulosin 0.4 milliGRAM(s) Oral once    MEDICATIONS  (PRN):  acetaminophen   Tablet .. 650 milliGRAM(s) Oral every 6 hours PRN Temp greater or equal to 38C (100.4F), Mild Pain (1 - 3)  ondansetron Injectable 4 milliGRAM(s) IV Push every 4 hours PRN Nausea and/or Vomiting    Allergies  penicillin (Rash)    Intolerances  None      VITAL SIGNS:  Vital Signs Last 24 Hrs  T(C): 36.7 (14 Oct 2021 09:11), Max: 37.5 (14 Oct 2021 04:36)  T(F): 98 (14 Oct 2021 09:11), Max: 99.5 (14 Oct 2021 04:36)  HR: 92 (14 Oct 2021 09:11) (65 - 100)  BP: 140/76 (14 Oct 2021 09:11) (119/61 - 148/72)  BP(mean): 102 (14 Oct 2021 07:45) (85 - 102)  RR: 18 (14 Oct 2021 09:11) (12 - 18)  SpO2: 94% (14 Oct 2021 09:11) (93% - 100%)      10-13-21 @ 07:01  -  10-14-21 @ 07:00  --------------------------------------------------------  IN: 1240 mL / OUT: 200 mL / NET: 1040 mL        PHYSICAL EXAM:  General: Elderly man, NAD, sitting comfortably in bed  HEENT: NC/AT, anicteric sclera  Neck: supple  Cardiovascular: +S1/S2, RRR, grade 2 systolic murmur at R 2nd intercostal space  Respiratory: CTA B/L, no W/R/R  Gastrointestinal: soft, NT/ND, +BSx4  Extremities: WWP, no edema, clubbing or cyanosis  Vascular: 2+ radial, DP/PT pulses B/L  Neurological: awake, alert, AAOx1 to self      LABS:                        9.4    6.76  )-----------( 218      ( 14 Oct 2021 07:52 )             29.5     10-14    140  |  104  |  20  ----------------------------<  113<H>  4.2   |  25  |  1.14    Ca    9.0      14 Oct 2021 07:52  Phos  2.9     10-14  Mg     1.6     10-14        Culture - Urine (collected 10-13-21 @ 15:53)  Source: Suprapubic bladder urine  Preliminary Report (10-14-21 @ 09:16):    No growth to date.        RADIOLOGY & ADDITIONAL TESTS: Reviewed.

## 2021-10-14 NOTE — PHYSICAL THERAPY INITIAL EVALUATION ADULT - PHYSICAL ASSIST/NONPHYSICAL ASSIST: SIT/STAND, REHAB EVAL
unsteady; increased time required to complete task; maintains flexed forward posture/verbal cues/nonverbal cues (demo/gestures)/2 person assist

## 2021-10-14 NOTE — PHYSICAL THERAPY INITIAL EVALUATION ADULT - GENERAL OBSERVATIONS, REHAB EVAL
PT IE completed. Chart reviewed. Patient without complaints of pain at rest, no resistance to passive movement and assistance with mobility for PT evaluation. Patient received semi-supine, NAD, +IV hep lock, +[place] cath, private RN (Nikki) at bedside, LUKE Campoverde cleared patient for treatment.

## 2021-10-14 NOTE — PHYSICAL THERAPY INITIAL EVALUATION ADULT - DISCHARGE DISPOSITION, PT EVAL
Home with Home PT and 1-2 person assist for all OOB mobility (1 person for stand-pivot to wheelchair or shower chair; 2 person for ambulation; **discussed with patient's daughter, Nuha)

## 2021-10-15 NOTE — PROGRESS NOTE ADULT - PROBLEM SELECTOR PLAN 8
H/o dementia A&Ox0. Pt was started on clonazepam 1mg PO TID prn and olanzapine 5mg PO qHS at OSH.  - Discourage use of any benzos in the elderly  - Can continue olanzapine 5mg PO qHS prn for behavioral disturbance  - C/w home vitamin B12 daily  - Med rec with desvenlafaxine 25mg daily, please obtain collateral regarding recent use as a taper is recommended for discontinuation    # Glaucoma  - C/w home eye drops: dorzolamide 2% 1 drop L eye BID, latanoprost 0.005% 1 drop L eye qHS.
H/o dementia A&Ox0. Pt was started on clonazepam 1mg PO TID prn and olanzapine 5mg PO qHS at OSH.  - DECREASE olanzapine 2.5mg PO to qHS standing for behavioral disturbance  - Can give additional PRN olanzapine 2.5mg PO or sublingual qHS for sundowning in the daytime  - AVOID wrist restraints when possible  - Discourage use of any benzos in the elderly  - C/w home vitamin B12 daily  - Med rec with desvenlafaxine 25mg daily, please obtain collateral regarding recent use as a taper is recommended for discontinuation    # Glaucoma  - C/w home eye drops: dorzolamide 2% 1 drop L eye BID, latanoprost 0.005% 1 drop L eye qHS.
H/o dementia A&Ox0. Pt was started on clonazepam 1mg PO TID prn and olanzapine 5mg PO qHS at OSH.  - START olanzapine 2.5mg PO BID standing for behavioral disturbance  - Can give additional PRN olanzapine 2.5mg PO or sublingual qHS for sundowning  - AVOID wrist restraints when possible  - Discourage use of any benzos in the elderly  - C/w home vitamin B12 daily  - Med rec with desvenlafaxine 25mg daily, please obtain collateral regarding recent use as a taper is recommended for discontinuation    # Glaucoma  - C/w home eye drops: dorzolamide 2% 1 drop L eye BID, latanoprost 0.005% 1 drop L eye qHS.

## 2021-10-15 NOTE — DISCHARGE NOTE PROVIDER - HOSPITAL COURSE
Patient is a 89y old  Male who presents with a chief complaint of Pre-op workup (15 Oct 2021 12:00)      HPI:  Patient is a 89y old  Male who presents with a chief complaint of Urinary retention, BPH.    HPI:     Patient is a 89M w/ PMH of Dementia, s/p pneumonia, urinary retention s/p borges catheter placement, HLD, HTN.  Patient was direct admitted for pre-op clearance, for scheduled TURP Wed 10/13.    Patient is Alert and oriented x 1 responds to name.  He has been pulling at his Borges catheter, one to one is at bedside.  Can not determine ROS due to mental status.     10/12: No acute overnight events, though patient was able to self extract borges catheter.   team replaced borges catheter.     10/13: No acute overnight events.  Patient underwent TURP.  Patient tolerated procedure well. Borges catheter in place draining yellow/clear output.    10/14: No acute overnight events.  Patient trial to void, PVR between 400-600cc.     10/15: No acute overnight events.  Patient VSS, HDS, and afebrile.  Patient is able to void but is retaining approx 400-600cc, which likely is his baseline Post void residual.    Due to patient state, he has a decubitus ulcer, fall risk since he is a 2 person assist after PT evaluation and can get aggressive, which will require 24hr home health aide..  He has also been pulling at IV lines and his borges catheter.  Patient will require a hospital bed at the facility, as well as PT and a home health aide.   Vital Signs Last 24 Hrs  T(C): 35.7 (11 Oct 2021 22:36), Max: 35.7 (11 Oct 2021 22:36)  T(F): 96.3 (11 Oct 2021 22:36), Max: 96.3 (11 Oct 2021 22:36)  HR: 66 (11 Oct 2021 20:34) (62 - 66)  BP: 151/83 (11 Oct 2021 20:34) (138/61 - 151/83)  BP(mean): --  RR: 17 (11 Oct 2021 20:34) (16 - 17)  SpO2: 95% (11 Oct 2021 20:34) (95% - 95%)  I&O's Summary                   Patient is a 89y old  Male who presents with a chief complaint of Urinary retention, BPH. He is now s/p Greenlight laser TURP 10/13. He passed TOV with only minimal hematuria present. He is afebrile, hemodynamically stable and optimized for discharge.    HPI:     Patient is a 89M w/ PMH of Dementia, s/p pneumonia, urinary retention s/p borges catheter placement, HLD, HTN.  Patient was direct admitted for pre-op clearance, for scheduled TURP Wed 10/13.    Patient is Alert and oriented x 1 responds to name.  He has been pulling at his Borges catheter, one to one is at bedside.  Can not determine ROS due to mental status.     10/12: No acute overnight events, though patient was able to self extract borges catheter.   team replaced borges catheter.     10/13: No acute overnight events.  Patient underwent TURP.  Patient tolerated procedure well. Borges catheter in place draining yellow/clear output.    10/14: No acute overnight events.  Patient trial to void, PVR between 400-600cc.     10/15: No acute overnight events.  Patient VSS, HDS, and afebrile.  Patient is able to void but is retaining approx 400-600cc, which likely is his baseline Post void residual.    Due to patient state, he has a decubitus ulcer, fall risk since he is a 2 person assist after PT evaluation and can get aggressive, which will require 24hr home health aide..  He has also been pulling at IV lines and his borges catheter.  Patient will require a hospital bed at the facility, as well as PT and a home health aide.

## 2021-10-15 NOTE — DISCHARGE NOTE PROVIDER - NSDCMRMEDTOKEN_GEN_ALL_CORE_FT
amLODIPine 5 mg oral tablet: 1 tab(s) orally once a day  atorvastatin 40 mg oral tablet: 1 tab(s) orally once a day (at bedtime)  calcium carbonate 1250 mg/5 mL (100 mg/mL elemental calcium) oral suspension: 2.6 milliliter(s) orally 2 times a day  cholecalciferol oral tablet: 2000 unit(s) orally once a day  cyanocobalamin 1000 mcg oral tablet: 1 tab(s) orally once a day  dorzolamide 2% ophthalmic solution: 1 drop(s) to each affected eye 3 times a day  Hospital Bed: 1 Hospital Bed unit(s)  daily.  Patient has a decubits ulcer, pulls IV&#x27;s and tubes, as well as he is a fall risk.  He is a 2 person assist for transfer and ambulation.   latanoprost 0.005% ophthalmic solution: 1 drop(s) to each affected eye once a day (at bedtime)  pantoprazole 40 mg oral delayed release tablet: 1 tab(s) orally once a day (before a meal)  Physical Therapy: Patient requires Physical therapy for strength training, stamina, and ambulation.     ZyPREXA 2.5 mg oral tablet: 1 tab(s) orally once a day (at bedtime)   Graft Cartilage Fenestration Text: The cartilage was fenestrated with a 2mm punch biopsy to help facilitate graft survival and healing.

## 2021-10-15 NOTE — DISCHARGE NOTE PROVIDER - NSDCFUADDINST_GEN_ALL_CORE_FT
General Discharge Instructions:  Use Tylenol for pain control as needed  Please resume all regular home medications unless specifically advised not to take a particular medication. Also, please take any new medications as prescribed.  Please get plenty of rest, continue to ambulate several times per day, and drink adequate amounts of fluids.     Warning Signs:  Please call your doctor if you experience the following:  *You experience new chest pain, pressure, squeezing or tightness.  *New or worsening cough, shortness of breath, or wheeze.  *If you are vomiting and cannot keep down fluids or your medications.  *You are getting dehydrated due to continued vomiting, diarrhea, or other reasons. Signs of dehydration include dry mouth, rapid heartbeat, or feeling dizzy or faint when standing.  *You see blood or dark/black material when you vomit or have a bowel movement.  *You experience burning when you urinate, have blood in your urine, or experience a discharge.  *Your pain is not improving within 8-12 hours or is not gone within 24 hours. Call or return immediately if your pain is getting worse, changes location, or moves to your chest or back.  *You have shaking chills, or fever greater than 100.4 degrees Fahrenheit.  *Any change in your symptoms, or any new symptoms that concern you.

## 2021-10-15 NOTE — DISCHARGE NOTE PROVIDER - NSDCHHENCOUNTER_GEN_ALL_CORE
Quinolones Pregnancy And Lactation Text: This medication is Pregnancy Category C and it isn't know if it is safe during pregnancy. It is also excreted in breast milk. 15-Oct-2021

## 2021-10-15 NOTE — CHART NOTE - NSCHARTNOTEFT_GEN_A_CORE
Admitting Diagnosis:   Patient is a 89y old  Male who presents with a chief complaint of Pre-op workup (15 Oct 2021 12:00)    PAST MEDICAL & SURGICAL HISTORY:  Dementia (AOx1), s/p pneumonia, urinary retention s/p borges catheter placement, HLD, HTN.    Current Nutrition Order:  Soft diet    PO Intake: Good (%) [   ]  Fair (50-75%) [   ] Poor (<25%) [ x  ]    GI Issues:   WDL, last BM 10/12  No n/v/d/c  No abd distention or discomfort  noted    Pain:  No pain noted at this time    Skin Integrity:  WDL, jose score 15  No pain noted at this time  stg I pressure ulcer left ankle    Labs:   10-15    140  |  103  |  19  ----------------------------<  114<H>  3.8   |  27  |  1.34<H>    Ca    9.4      15 Oct 2021 08:38  Phos  3.0     10-15  Mg     1.7     10-15    Medications:  MEDICATIONS  (STANDING):  amLODIPine   Tablet 5 milliGRAM(s) Oral daily  aspirin  chewable 81 milliGRAM(s) Oral daily  atorvastatin 40 milliGRAM(s) Oral at bedtime  calcium carbonate   Suspension 650 milliGRAM(s) Oral two times a day  cefTRIAXone   IVPB 1000 milliGRAM(s) IV Intermittent every 24 hours  cholecalciferol 2000 Unit(s) Oral daily  cyanocobalamin 1000 MICROGram(s) Oral daily  dorzolamide 2% Ophthalmic Solution 1 Drop(s) Left EYE three times a day  heparin   Injectable 5000 Unit(s) SubCutaneous every 8 hours  influenza  Vaccine (HIGH DOSE) 0.7 milliLiter(s) IntraMuscular once  latanoprost 0.005% Ophthalmic Solution 1 Drop(s) Left EYE at bedtime  OLANZapine 2.5 milliGRAM(s) Oral at bedtime  pantoprazole    Tablet 40 milliGRAM(s) Oral before breakfast  polyethylene glycol 3350 17 Gram(s) Oral daily    MEDICATIONS  (PRN):  acetaminophen   Tablet .. 650 milliGRAM(s) Oral every 6 hours PRN Temp greater or equal to 38C (100.4F), Mild Pain (1 - 3)  OLANZapine 2.5 milliGRAM(s) Oral daily PRN agitation  ondansetron Injectable 4 milliGRAM(s) IV Push every 4 hours PRN Nausea and/or Vomiting    Admitting Anthropometrics:  · Height for BMI (FEET)	5 Feet  · Height for BMI (INCHES)	7 Inch(s)  · Height for BMI (CENTIMETERS)	170.18 Centimeter(s)  · Weight for BMI (lbs)	174.2 lb  · Weight for BMI (kg)	79 kg  · Body Mass Index	27.2  · Ideal Body Weight (lbs)	148  · Ideal Body Weight (kg)	67.1    Weight Change: No new weights obtained during this admission. Please cont to trend weights weekly.     Nutrition Focused Physical Exam: Completed [   ]  Not Pertinent [ x  ]    Estimated energy needs:   ABW used for calculations as pt between % of IBW. (118%). Needs adjusted for advanced age, pressure ulcer, and pre/post nutr optimization.  Kcal (20-25 kcal/kg): 2352-7008 kcal  Protein (1.1-1.3 g/kg pro): 86-103g pro  Fluids (30-35 ml/kg): 0936-7753 ml    Subjective:   89M w/ PMH of Dementia (AOx1), s/p pneumonia, urinary retention s/p borges catheter placement, HLD, HTN.  Patient was direct admitted for pre-op clearance, for scheduled TURP Wed 10/13. Plan for possible d/c back to SNF over the weekend.     On assessment, pt resting in bed pleasantly confused, with PCA at bedside. Breakfast tray at beside, pt consumed ~25% of meal. Per PCA, pt had 1-2 bites or sips of each item before stopping. PCA providing constant encouragement, but pt has been refusing PO intake. No n/v/d/c. No abd distention or discomfort. last BM 10/12. No pain noted at this time. Education deferred. Cont with supplementation of Vit D3 and B12. Pt may benefit from addition of MVI daily and Ensure Enlive BID (700 kcal, 40g protein, 360 mL free H2O)- team paged. Please see nutr recs below.     Previous Nutrition Diagnosis: Increased Nutrient Needs RT pre/post op nutr optimization AEB s/p TURP, pressure ulcer wound healing    Active [ x  ]  Resolved [   ]    If resolved, new PES:     Goal/Expected Outcome Consistently meet >75% est needs.    Recommendations:  1. Cont with soft diet  >> Recommend addition of Ensure Enlive BID (700 kcal, 40g protein, 360 mL free H2O)  2. Recommend addition of daily MVI  3. Pain and bowel regimen per team  4. Cont to monitor lytes and replete prn  5. RD diet edu prn  **team paged    Education: Deferred    Risk Level: High [ x  ] Moderate [   ] Low [   ]
Nurse paged due to hypothermia. Rectal temperature 92.8    Vital Signs Last 24 Hrs  T(C): 33.8 (13 Oct 2021 20:56), Max: 36.6 (13 Oct 2021 17:00)  T(F): 92.8 (13 Oct 2021 20:56), Max: 97.8 (13 Oct 2021 17:00)  HR: 69 (13 Oct 2021 20:56) (65 - 92)  BP: 148/72 (13 Oct 2021 20:56) (117/70 - 162/71)  BP(mean): 98 (13 Oct 2021 20:56) (85 - 100)  RR: 16 (13 Oct 2021 20:56) (12 - 18)  SpO2: 96% (13 Oct 2021 20:56) (96% - 100%)    Vitals stable. Blood cultures from yesterday NGTD. White count downtrending   Patient evaluated and appears stable with marck hugger in place.     Medicine team following and paged. Medicine resident recommends further monitoring and obtaining AM Cortisol and TSH.

## 2021-10-15 NOTE — PROGRESS NOTE ADULT - ATTENDING COMMENTS
Initial attending contact date 10/12/21     . See fellow note written above for details. I reviewed the fellow documentation. I have personally seen and examined this patient. I reviewed vitals, labs, medications, cardiac studies, and additional imaging. I agree with the above fellow's findings and plans as written above with the following additions/statements.     -89M w/ PMH of CAD s/p PCI 8 years ago (on ASA), HTN, HLD, Dementia AAO x 0, BPH admitted with SANCHEZ secondary to urinary retention  -With plan for TURP 10/13  -EKG NSR with 1st degree AV block, RBBB (no prior EKG to compare)  -pt essentially wheelchair bound, per family denies CP/SOB  -on exam, euvolemic with systolic murmur suggestive of likely moderate AS  -SBP range 120-150. Hypertensive likely while agitated. Cont amlodipine 5mg qd  -Cont ASA (can be held if needed for OR), statin  -Pt considered intermediate risk for low risk procedure. No need for further cardiac work up at this time  -Will fu post op .
Pt appears more sleepy today. Received 2.5 zyprexa x2 yesterday. Denies pain. Unable to obtain full ROS. Hypothermia resolved    Recommendation  -f/u TOV  -Recommend zyprexa 2.5mg at bedtime only. Avoid benzos. Caution using physical wrist restraints    DISPO: Nursing home
89M w dementia (A/O x0), HTN, HLD, CAD w stents, DJD, nursing home resident presented to [facility] for pneumonia - readmitted for SANCHEZ 2/2 urinary retention - transferred to [facility] s/p TURP 10/13    Pt seated in bed - reaching for IVs. Chairez d/c'd. Unable to obtain ROS 2/2 mental status. He appears in NAD, RRR, CTAB wo rales, wheezing, rhonchi, abd soft, non-tender  Caution using physical wrist restraints. Encourage reorientation by enahanced observation. Optimize pain. Ensure regular BMs  -Can use zyprexa - trial 2.5mg and can redose for agitation. Noted this was used at OSH
Case discussed with Dr. Tate however provider unable to see as pt taken to OR and did not return to floors.   Thus not billed.    Recommend optimizing pain, ensure pt voids, avoid constipation, avoid benzodizapines. Encourage re-orientation. Caution with physical restraints    Med Consult will continue to follow

## 2021-10-15 NOTE — PROGRESS NOTE ADULT - PROBLEM SELECTOR PLAN 5
H/o CAD w/ stents placed 2013 at [facility].  - Restarted asa 81 daily post-op
H/o CAD w/ stents placed 2013 at [facility].  - Restarted asa 81 daily post-op as tolerated
H/o CAD w/ stents placed 2013 at [facility].  - Recommend holding pre-operatively, restart post-op as tolerated.

## 2021-10-15 NOTE — DISCHARGE NOTE PROVIDER - CARE PROVIDER_API CALL
Deven Saez)  Urology  [address] [place] [address]  Phone: [phone]  Fax: [phone]  Follow Up Time:

## 2021-10-15 NOTE — PROGRESS NOTE ADULT - SUBJECTIVE AND OBJECTIVE BOX
Based on patients on going issues w/ deconditioning and generalized weakness secondary to patients diagnosis or BPH.  Patient will require a semi electric hospital bed.  This is necessary to achieve positioning and elevation not able to obtain in an ordinary bed.  Bed pillows and wedges have been tried and ruled out.  Patient has a bed ulcer and is a 2 person assist for all OOB function.          VITALS:    T(F): 97.8 (10-15-21 @ 08:42), Max: 98.1 (10-14-21 @ 20:45)  HR: 89 (10-15-21 @ 08:42) (81 - 91)  BP: 150/82 (10-15-21 @ 08:42) (130/77 - 150/82)  RR: 20 (10-15-21 @ 08:42) (16 - 20)  SpO2: 94% (10-15-21 @ 08:42) (94% - 96%)  Wt(kg): --    I&O's Detail    14 Oct 2021 07:01  -  15 Oct 2021 07:00  --------------------------------------------------------  IN:  Total IN: 0 mL    OUT:    Voided (mL): 425 mL  Total OUT: 425 mL    Total NET: -425 mL          MEDICATIONS:    ANTIBIOTICS:  cefTRIAXone   IVPB 1000 milliGRAM(s) IV Intermittent every 24 hours      PAIN CONTROL:  acetaminophen   Tablet .. 650 milliGRAM(s) Oral every 6 hours PRN  OLANZapine 2.5 milliGRAM(s) Oral at bedtime  OLANZapine 2.5 milliGRAM(s) Oral daily PRN  ondansetron Injectable 4 milliGRAM(s) IV Push every 4 hours PRN       MEDS:      HEME/ONC  aspirin  chewable 81 milliGRAM(s) Oral daily  heparin   Injectable 5000 Unit(s) SubCutaneous every 8 hours      LABS:                        9.9    6.21  )-----------( 216      ( 15 Oct 2021 08:38 )             31.7     10-15    140  |  103  |  19  ----------------------------<  114<H>  3.8   |  27  |  1.34<H>    Ca    9.4      15 Oct 2021 08:38  Phos  3.0     10-15  Mg     1.7     10-15            RADIOLOGY & ADDITIONAL TESTS:    ASSESSMENT: 89yMale s/p     PLAN:  Diet: clears  Pain control  Monitor Urine Output  DVT ppx  Cont Abx  OOB/IS  2

## 2021-10-15 NOTE — PROGRESS NOTE ADULT - SUBJECTIVE AND OBJECTIVE BOX
INTERVAL HPI/OVERNIGHT EVENTS:  No acute events overnight. Patient continues to void with PVR around 400-500 which can be baseline for patient. No difficulty urinating however hematuria appreciated.     VITALS:    T(F): 98.1 (10-15-21 @ 04:55), Max: 98.3 (10-14-21 @ 07:45)  HR: 81 (10-15-21 @ 04:55) (81 - 98)  BP: 130/77 (10-15-21 @ 04:55) (130/77 - 147/79)  RR: 16 (10-15-21 @ 04:55) (16 - 18)  SpO2: 94% (10-15-21 @ 04:55) (93% - 96%)  Wt(kg): --    I&O's Detail    13 Oct 2021 07:01  -  14 Oct 2021 07:00  --------------------------------------------------------  IN:    Oral Fluid: 200 mL    sodium chloride 0.9%: 1040 mL  Total IN: 1240 mL    OUT:    Indwelling Catheter - Urethral (mL): 200 mL  Total OUT: 200 mL    Total NET: 1040 mL      14 Oct 2021 07:01  -  15 Oct 2021 06:11  --------------------------------------------------------  IN:  Total IN: 0 mL    OUT:    Voided (mL): 425 mL  Total OUT: 425 mL    Total NET: -425 mL          MEDICATIONS:    ANTIBIOTICS:  cefTRIAXone   IVPB 1000 milliGRAM(s) IV Intermittent every 24 hours      PAIN CONTROL:  acetaminophen   Tablet .. 650 milliGRAM(s) Oral every 6 hours PRN  OLANZapine 2.5 milliGRAM(s) Oral two times a day  ondansetron Injectable 4 milliGRAM(s) IV Push every 4 hours PRN       MEDS:      HEME/ONC  aspirin  chewable 81 milliGRAM(s) Oral daily  heparin   Injectable 5000 Unit(s) SubCutaneous every 8 hours        PHYSICAL EXAM:  General: No acute distress.  Alert and Oriented  Abdominal Exam: soft, non-tender, non-distended    Exam: TOV, hematuria+      LABS:                        9.4    6.76  )-----------( 218      ( 14 Oct 2021 07:52 )             29.5     10-14    140  |  104  |  20  ----------------------------<  113<H>  4.2   |  25  |  1.14    Ca    9.0      14 Oct 2021 07:52  Phos  2.9     10-14  Mg     1.6     10-14

## 2021-10-15 NOTE — DISCHARGE NOTE PROVIDER - NSDCCPTREATMENT_GEN_ALL_CORE_FT
PRINCIPAL PROCEDURE  Procedure: Cystoscopy, with photoselective vaporization of prostate using 532nm greenlight laser  Findings and Treatment:

## 2021-10-15 NOTE — DISCHARGE NOTE PROVIDER - NSDCCPCAREPLAN_GEN_ALL_CORE_FT
PRINCIPAL DISCHARGE DIAGNOSIS  Diagnosis: BPH with urinary obstruction  Assessment and Plan of Treatment: s/p greenlight TURP. Acceptable PVR as per Dr. Saez. Minimal hematuria remaining appropriate to state. Follow up With Dr. Saez.   Please resume all home medications.      SECONDARY DISCHARGE DIAGNOSES  Diagnosis: Dementia  Assessment and Plan of Treatment: Please resume home medication regimen. Follow up with PCP/Specialist.    Diagnosis: HTN (hypertension)  Assessment and Plan of Treatment: Please resume home medication regimen. Follow up with PCP/Specialist.

## 2021-10-15 NOTE — PROGRESS NOTE ADULT - SUBJECTIVE AND OBJECTIVE BOX
OVERNIGHT EVENTS: Voiding however with high -500cc. Hematuria noted by primary team. Per aid at bedside, pt slept throughout the night.    SUBJECTIVE / INTERVAL HPI: Patient seen and examined at bedside. Pt sleeping at time of interview. Per aid at bedside, pt was awake for breakfast but did not eat very much. Awoke to voice, endorses feeling very tired but denies any pain or discomfort. Unable to obtain rest of ROS.    MEDICATIONS  (STANDING):  amLODIPine   Tablet 5 milliGRAM(s) Oral daily  aspirin  chewable 81 milliGRAM(s) Oral daily  atorvastatin 40 milliGRAM(s) Oral at bedtime  calcium carbonate   Suspension 650 milliGRAM(s) Oral two times a day  cefTRIAXone   IVPB 1000 milliGRAM(s) IV Intermittent every 24 hours  cholecalciferol 2000 Unit(s) Oral daily  cyanocobalamin 1000 MICROGram(s) Oral daily  dorzolamide 2% Ophthalmic Solution 1 Drop(s) Left EYE three times a day  heparin   Injectable 5000 Unit(s) SubCutaneous every 8 hours  influenza  Vaccine (HIGH DOSE) 0.7 milliLiter(s) IntraMuscular once  latanoprost 0.005% Ophthalmic Solution 1 Drop(s) Left EYE at bedtime  OLANZapine 2.5 milliGRAM(s) Oral at bedtime  pantoprazole    Tablet 40 milliGRAM(s) Oral before breakfast  polyethylene glycol 3350 17 Gram(s) Oral daily    MEDICATIONS  (PRN):  acetaminophen   Tablet .. 650 milliGRAM(s) Oral every 6 hours PRN Temp greater or equal to 38C (100.4F), Mild Pain (1 - 3)  OLANZapine 2.5 milliGRAM(s) Oral daily PRN agitation  ondansetron Injectable 4 milliGRAM(s) IV Push every 4 hours PRN Nausea and/or Vomiting    Allergies  penicillin (Rash)    Intolerances  None      VITAL SIGNS:  Vital Signs Last 24 Hrs  T(C): 36.6 (15 Oct 2021 08:42), Max: 36.7 (14 Oct 2021 20:45)  T(F): 97.8 (15 Oct 2021 08:42), Max: 98.1 (14 Oct 2021 20:45)  HR: 89 (15 Oct 2021 08:42) (81 - 96)  BP: 150/82 (15 Oct 2021 08:42) (130/77 - 150/82)  BP(mean): 95 (15 Oct 2021 04:55) (85 - 95)  RR: 20 (15 Oct 2021 08:42) (16 - 20)  SpO2: 94% (15 Oct 2021 08:42) (94% - 96%)      10-14-21 @ 07:01  -  10-15-21 @ 07:00  --------------------------------------------------------  IN: 0 mL / OUT: 425 mL / NET: -425 mL        PHYSICAL EXAM:  General: Elderly man, NAD, sitting comfortably in bed  HEENT: NC/AT, anicteric sclera  Neck: supple  Cardiovascular: +S1/S2, RRR, grade 2 systolic murmur at R 2nd intercostal space  Respiratory: Faint rhonchi in b/l lung bases, apices CTA B/L, no W/R/R  Gastrointestinal: soft, NT/ND, +BSx4  Extremities: WWP, no edema, clubbing or cyanosis  Vascular: 2+ radial, DP/PT pulses B/L  Neurological: sleepy arousable with voice, AAOx1 to self      LABS:                        9.9    6.21  )-----------( 216      ( 15 Oct 2021 08:38 )             31.7     10-15    140  |  103  |  19  ----------------------------<  114<H>  3.8   |  27  |  1.34<H>    Ca    9.4      15 Oct 2021 08:38  Phos  3.0     10-15  Mg     1.7     10-15        RADIOLOGY & ADDITIONAL TESTS: Reviewed.

## 2021-10-15 NOTE — PROGRESS NOTE ADULT - PROBLEM SELECTOR PROBLEM 5
informed pt of results and need to f/u with PMD for repeat testing.
CAD (coronary artery disease)

## 2021-10-16 NOTE — PROGRESS NOTE ADULT - ASSESSMENT
89 yo male with urinary retention; h/o BPH
89M w/ PMHx of dementia (A&Ox0) HTN, HLD, CAD w/ stents placed 2013 on aspirin, glaucoma, DJD, COVID pna in 4/2020, recently hospitalized at [facility] for bacterial pna 9/27-10/4/2021, readmitted 10/6 for SANCHEZ from urinary retention, now transferred to [facility] now s/p TURP 10/13. Medicine consulted for pre-op and comanagement. 
Patient is a 89M w/ BPH s/p TURP. Patient is VSS, afebrile.    PLan:  -Continue plan of care. 
Initial attending contact date 10/12/21         -89M w/ PMH of CAD s/p PCI 8 years ago (on ASA), HTN, HLD, Dementia AAO x 0, BPH admitted with SANCHEZ secondary to urinary retention  -s/p TURP 10/13  -EKG NSR with 1st degree AV block, RBBB (no prior EKG to compare)  -on exam, euvolemic with systolic murmur suggestive of likely moderate AS  -SBP range 120-150. Hypertensive likely while agitated. Cont amlodipine 5mg qd  -Cont ASA (can be held if needed for OR), statin  -No need for further cardiac work up at this time    
89M w/ PMHx of dementia (A&Ox0) HTN, HLD, CAD w/ stents placed 2013 on aspirin, glaucoma, DJD, COVID pna in 4/2020, recently hospitalized at [facility] for bacterial pna 9/27-10/4/2021, readmitted 10/6 for SANCHEZ from urinary retention, now transferred to [facility] for TURP.Patient underwent TURP 10/13. 
89M w/ PMHx of dementia (A&Ox0) HTN, HLD, CAD w/ stents placed 2013 on aspirin, glaucoma, DJD, COVID pna in 4/2020, recently hospitalized at [facility] for bacterial pna 9/27-10/4/2021, readmitted 10/6 for SANCHEZ from urinary retention, now transferred to [facility] for TURP.Patient underwent TURP 10/13. 
89M w/ PMHx of dementia (A&Ox0) HTN, HLD, CAD w/ stents placed 2013 on aspirin, glaucoma, DJD, COVID pna in 4/2020, recently hospitalized at [facility] for bacterial pna 9/27-10/4/2021, readmitted 10/6 for SANCHEZ from urinary retention, now transferred to [facility] for TURP.Patient underwent TURP 10/13. 
Patient is a 89M w/ PMH of CAD s/p PCI 8 years ago (on ASA), HTN, HLD, Dementia, who directly admitted for scheduled TURP on Wed 10/13. Cardiology consulted for pre-op.    #Pre-op   Hx of CAD on ASA. Does not appear to be in acute cardiac event (ACS, decompensated HF).   RCRI score of 1. 6% 30-day risk of death, MI, or cardiac arrest. Larson 0.3%. Unable to assess Mets, likely <4.  -EKG NSR and 1st degree AV block,RBBB  - Patient is a low to intermediate risk for a low risk procedure.   - c/w home ASA, amlodipine and atorvastatin;   - can hold home ASA the day of procedur
89M w/ PMHx of dementia (A&Ox0) HTN, HLD, CAD w/ stents placed 2013 on aspirin, glaucoma, DJD, COVID pna in 4/2020, recently hospitalized at [facility] for bacterial pna 9/27-10/4/2021, readmitted 10/6 for SANCHEZ from urinary retention, now transferred to [facility] now s/p TURP 10/13. Medicine consulted for pre-op and comanagement. 
89M w/ PMHx of dementia (A&Ox0) HTN, HLD, CAD w/ stents placed 2013 on aspirin, glaucoma, DJD, COVID pna in 4/2020, recently hospitalized at [facility] for bacterial pna 9/27-10/4/2021, readmitted 10/6 for SANCHEZ from urinary retention, now transferred to [facility] for TURP. Medicine consulted for pre-op and comanagement.

## 2021-10-16 NOTE — PROGRESS NOTE ADULT - SUBJECTIVE AND OBJECTIVE BOX
AM Note    No acute events overnight. Patient continues to void with PVR around 400-600 which can be baseline for patient. No difficulty urinating however hematuria appreciated. Hard for nursing to record voiding volumes as pt incontinent.    Vital Signs Last 24 Hrs  T(C): 36.8 (10-16-21 @ 05:20), Max: 37.1 (10-15-21 @ 15:39)  T(F): 98.3 (10-16-21 @ 05:20), Max: 98.8 (10-15-21 @ 15:39)  HR: 78 (10-16-21 @ 05:20) (72 - 89)  BP: 123/67 (10-16-21 @ 05:20) (119/63 - 165/72)  BP(mean): 86 (10-16-21 @ 05:20) (86 - 103)  RR: 16 (10-16-21 @ 05:20) (16 - 20)  SpO2: 94% (10-16-21 @ 05:20) (93% - 96%)     15 Oct 2021 08:38    140    |  103    |  19     ----------------------------<  114    3.8     |  27     |  1.34     Ca    9.4        15 Oct 2021 08:38  Phos  3.0       15 Oct 2021 08:38  Mg     1.7       15 Oct 2021 08:38                            9.9    6.21  )-----------( 216      ( 15 Oct 2021 08:38 )             31.7         I&O's Summary    15 Oct 2021 07:01  -  16 Oct 2021 07:00  --------------------------------------------------------  IN: 50 mL / OUT: 0 mL / NET: 50 mL          PHYSICAL EXAM:    General: No acute distress.  Alert and Oriented  Abdominal Exam: soft, non-tender, non-distended    Exam: TOV, hematuria+

## 2021-10-16 NOTE — PROGRESS NOTE ADULT - PROBLEM SELECTOR PLAN 1
-stable  -OOB  -SCD's, IS  -f/u labs  -NPO for OR today- cysto , turp  -consent obtained last night from HCP/daughter
Presented to OSH on 10/6 for no urine output x 2 days, found to have creatinine 2.2 and BUN 50. S/p Chairez however failed TOV with Flomax and Proscar, now transferred to [facility] for TURP. Likely 2/2 postobstruction from enlarged prostate on ultrasound done at OSH. Now s/p TURP 10/13. Chairez removed 10/14. Voiding however with high PVR.  - Management of high PVR per primary team  - Trend daily BMP  - Renally dose all medications
- continue to monitor rectal temps [id number]   - marck nixon as needed   - f/u AM cortisol and TSH
- continue to monitor rectal temps [id number]   - marck hugger as needed   -TOV -continue PVR  -monitor I&Os  -pain control  -DVT prophylaxis  -d/c planning  -am labs
Presenting as a transfer from outside hospital with rectal temp 92.3F now improved to euthermia. No other s/s of systemic infection. Possibly due to environment during ambulance transfer from outside hospital. Recent hospital records have blood cultures from 9/27 and 10/6 NGTD, and urine cultures 9/28 and 10/6 NGTD. UA from 10/6 with blood, trace LE and bacteria. 2nd episode of hypothermia to 92F rectal night of post-op day 0 resolved w/ Jeremy nixon and decreased CBI. Repeat blood cx 10/12 NGTD, repeat urine cx 10/12 and 10/13 NGTD. TSH and AM cortisol wnl.  - Recommend DISCONTINUE IVF if no longer indicated  - C/w empiric reece-op antibiotics per primary team for bacturia  - Continue to monitor for s/s of sepsis  - Jeremy nixon prn for hypothermia.
- marck nixon as needed   -TOV -continue PVR  -monitor I&Os  -pain control  -DVT prophylaxis  -d/c planning  -am labs
Improved. Presenting as a transfer from outside hospital with rectal temp 92.3F now improved to euthermia. No other s/s of systemic infection. Possibly due to environment during ambulance transfer from outside hospital. Recent hospital records have blood cultures from 9/27 and 10/6 NGTD, and urine cultures 9/28 and 10/6 NGTD. UA from 10/6 with blood, trace LE and bacteria.  - C/w empiric antibiotics per primary team for bacturia prior to urologic procedure  - Recommend repeat blood cultures x 2 sets and urine culture  - Continue to monitor for s/s of sepsis  - Jeremy nixon prn for hypothermia.

## 2021-10-16 NOTE — PROGRESS NOTE ADULT - PROVIDER SPECIALTY LIST ADULT
Cardiology
Internal Medicine
Urology
Cardiology
Urology
Internal Medicine

## 2021-10-16 NOTE — PROGRESS NOTE ADULT - PROBLEM SELECTOR PLAN 3
- s/p TURP   - continue to monitor urine output   - possible TOV today   - OOB/IS   - SCDs   - abx: Ceftriaxone   - diet: softs
Grade 2 systolic murmur on exam. Daughter at bedside reports longstanding h/o cardiac murmur. No prior echo in EMR.  - Monitor hemodynamics
- s/p TURP   - continue to monitor urine output   - possible TOV today   - OOB/IS   - SCDs   - abx: Ceftriaxone   - diet: softs
- s/p TURP   - continue to monitor urine output   - possible TOV today   - OOB/IS   - SCDs   - abx: Ceftriaxone   - diet: softs
Grade 2 systolic murmur on exam. Daughter at bedside reports longstanding h/o cardiac murmur. No prior echo in EMR.  - Agree with cardiology team, recommend TTE to assess for valvular dysfunction.
Grade 2 systolic murmur on exam. Daughter at bedside reports longstanding h/o cardiac murmur. No prior echo in EMR.  - Agree with cardiology team, recommend TTE to assess for valvular dysfunction.

## 2021-10-16 NOTE — PROGRESS NOTE ADULT - PROBLEM SELECTOR PLAN 2
-1:1 nurse at bedside
Improving. Presented to OSH on 10/6 for no urine output x 2 days, found to have creatinine 2.2 and BUN 50. S/p Chairez however failed TOV with Flomax and Proscar, now transferred to [facility] for TURP. Likely 2/2 postobstruction from enlarged prostate on ultrasound done at OSH. Now s/p TURP 10/13. Chairez removed 10/14.  - Trend daily BMP  - Renally dose all medications  - TOV per primary team
-1:1 nurse at bedside
-1:1 nurse at bedside
RESOLVED. Presenting as a transfer from outside hospital with rectal temp 92.3F now improved to euthermia. No other s/s of systemic infection. Possibly due to environment during ambulance transfer from outside hospital. Recent hospital records have blood cultures from 9/27 and 10/6 NGTD, and urine cultures 9/28 and 10/6 NGTD. UA from 10/6 with blood, trace LE and bacteria. 2nd episode of hypothermia to 92F rectal night of post-op day 0 resolved w/ Jeremy nixon and decreased CBI. Repeat blood cx 10/12 NGTD, repeat urine cx 10/12 and 10/13 NGTD. TSH and AM cortisol wnl.  - C/w empiric reece-op antibiotics per primary team for bacturia  - Continue to monitor for s/s of sepsis  - Jeremy nixon prn for hypothermia.
Improving. Presented to OSH on 10/6 for no urine output x 2 days, found to have creatinine 2.2 and BUN 50. S/p Chairez however failed TOV with Flomax and Proscar, now transferred to [facility] for TURP. Likely 2/2 postobstruction from enlarged prostate on ultrasound done at OSH.  - Trend daily BMP  - Renally dose all medications for reduced CrCl  - Chairez management per primary team  - Scheduled for TURP today

## 2021-10-16 NOTE — PROGRESS NOTE ADULT - PROBLEM SELECTOR PROBLEM 2
Dementia
Hypothermia, endogenous
SANCHEZ (acute kidney injury)
SANCHEZ (acute kidney injury)

## 2021-10-16 NOTE — DISCHARGE NOTE NURSING/CASE MANAGEMENT/SOCIAL WORK - PATIENT PORTAL LINK FT
You can access the FollowMyHealth Patient Portal offered by [facility] by registering at the following website: [email or web address]. By joining [facility]’s FollowMyHealth portal, you will also be able to view your health information using other applications (apps) compatible with our system.

## 2021-10-16 NOTE — PROGRESS NOTE ADULT - PROBLEM SELECTOR PROBLEM 3
History of cardiac murmur
Urinary retention
History of cardiac murmur
Urinary retention
Urinary retention
History of cardiac murmur

## 2021-10-16 NOTE — PROGRESS NOTE ADULT - REASON FOR ADMISSION
Pre-op workup
TURP
Pre-op workup
Pre-op workup

## 2021-10-16 NOTE — DISCHARGE NOTE NURSING/CASE MANAGEMENT/SOCIAL WORK - NSDCVIVACCINE_GEN_ALL_CORE_FT
influenza, high-dose, quadrivalent; 15-Oct-2021 22:31; Virginie Andre (RN); [employer]; [id number] (Exp. Date: 30-Jun-2022); IntraMuscular; Vastus Lateralis Left.; 0.7 milliLiter(s); VIS (VIS Published: 06-Aug-2021, VIS Presented: 15-Oct-2021);

## 2021-10-16 NOTE — PROGRESS NOTE ADULT - PROBLEM SELECTOR PROBLEM 1
SANCHEZ (acute kidney injury)
Urinary retention
Hypothermia, endogenous

## 2022-01-01 ENCOUNTER — NON-APPOINTMENT (OUTPATIENT)
Age: 87
End: 2022-01-01

## 2022-01-01 ENCOUNTER — APPOINTMENT (OUTPATIENT)
Dept: HOME HEALTH SERVICES | Facility: HOME HEALTH | Age: 87
End: 2022-01-01
Payer: MEDICARE

## 2022-01-01 ENCOUNTER — TRANSCRIPTION ENCOUNTER (OUTPATIENT)
Age: 87
End: 2022-01-01

## 2022-01-01 ENCOUNTER — LABORATORY RESULT (OUTPATIENT)
Age: 87
End: 2022-01-01

## 2022-01-01 ENCOUNTER — FORM ENCOUNTER (OUTPATIENT)
Age: 87
End: 2022-01-01

## 2022-01-01 ENCOUNTER — RX CHANGE (OUTPATIENT)
Age: 87
End: 2022-01-01

## 2022-01-01 ENCOUNTER — APPOINTMENT (OUTPATIENT)
Dept: HOME HEALTH SERVICES | Facility: HOME HEALTH | Age: 87
End: 2022-01-01

## 2022-01-01 VITALS
OXYGEN SATURATION: 95 % | BODY MASS INDEX: 21.47 KG/M2 | HEIGHT: 70 IN | WEIGHT: 150 LBS | HEART RATE: 68 BPM | SYSTOLIC BLOOD PRESSURE: 132 MMHG | RESPIRATION RATE: 16 BRPM | TEMPERATURE: 97.1 F | DIASTOLIC BLOOD PRESSURE: 72 MMHG

## 2022-01-01 VITALS
OXYGEN SATURATION: 93 % | RESPIRATION RATE: 18 BRPM | DIASTOLIC BLOOD PRESSURE: 72 MMHG | HEART RATE: 70 BPM | SYSTOLIC BLOOD PRESSURE: 118 MMHG | HEIGHT: 70 IN | TEMPERATURE: 97.3 F

## 2022-01-01 VITALS
WEIGHT: 150 LBS | RESPIRATION RATE: 16 BRPM | BODY MASS INDEX: 21.47 KG/M2 | OXYGEN SATURATION: 94 % | SYSTOLIC BLOOD PRESSURE: 128 MMHG | HEIGHT: 70 IN | TEMPERATURE: 97.1 F | HEART RATE: 68 BPM | DIASTOLIC BLOOD PRESSURE: 68 MMHG

## 2022-01-01 VITALS
HEIGHT: 70 IN | DIASTOLIC BLOOD PRESSURE: 70 MMHG | TEMPERATURE: 97.1 F | HEART RATE: 58 BPM | SYSTOLIC BLOOD PRESSURE: 132 MMHG | OXYGEN SATURATION: 97 % | RESPIRATION RATE: 18 BRPM

## 2022-01-01 VITALS
RESPIRATION RATE: 18 BRPM | SYSTOLIC BLOOD PRESSURE: 130 MMHG | DIASTOLIC BLOOD PRESSURE: 70 MMHG | OXYGEN SATURATION: 97 % | HEART RATE: 70 BPM | TEMPERATURE: 97.3 F

## 2022-01-01 VITALS
DIASTOLIC BLOOD PRESSURE: 70 MMHG | RESPIRATION RATE: 18 BRPM | SYSTOLIC BLOOD PRESSURE: 118 MMHG | OXYGEN SATURATION: 94 % | HEART RATE: 70 BPM | TEMPERATURE: 97.1 F | BODY MASS INDEX: 21.47 KG/M2 | WEIGHT: 150 LBS | HEIGHT: 70 IN

## 2022-01-01 VITALS
TEMPERATURE: 97.1 F | OXYGEN SATURATION: 94 % | DIASTOLIC BLOOD PRESSURE: 70 MMHG | HEIGHT: 70 IN | HEART RATE: 67 BPM | SYSTOLIC BLOOD PRESSURE: 128 MMHG | RESPIRATION RATE: 18 BRPM | BODY MASS INDEX: 21.47 KG/M2 | WEIGHT: 150 LBS

## 2022-01-01 DIAGNOSIS — Z71.89 OTHER SPECIFIED COUNSELING: ICD-10-CM

## 2022-01-01 DIAGNOSIS — R13.12 DYSPHAGIA, OROPHARYNGEAL PHASE: ICD-10-CM

## 2022-01-01 DIAGNOSIS — H40.9 UNSPECIFIED GLAUCOMA: ICD-10-CM

## 2022-01-01 DIAGNOSIS — G47.09 OTHER INSOMNIA: ICD-10-CM

## 2022-01-01 DIAGNOSIS — N17.9 ACUTE KIDNEY FAILURE, UNSPECIFIED: ICD-10-CM

## 2022-01-01 DIAGNOSIS — R41.9 UNSPECIFIED SYMPTOMS AND SIGNS INVOLVING COGNITIVE FUNCTIONS AND AWARENESS: ICD-10-CM

## 2022-01-01 DIAGNOSIS — I10 ESSENTIAL (PRIMARY) HYPERTENSION: ICD-10-CM

## 2022-01-01 DIAGNOSIS — K59.00 CONSTIPATION, UNSPECIFIED: ICD-10-CM

## 2022-01-01 DIAGNOSIS — N39.0 URINARY TRACT INFECTION, SITE NOT SPECIFIED: Chronic | ICD-10-CM

## 2022-01-01 DIAGNOSIS — R41.82 ALTERED MENTAL STATUS, UNSPECIFIED: ICD-10-CM

## 2022-01-01 DIAGNOSIS — R33.8 OTHER RETENTION OF URINE: ICD-10-CM

## 2022-01-01 DIAGNOSIS — Z86.69 PERSONAL HISTORY OF OTHER DISEASES OF THE NERVOUS SYSTEM AND SENSE ORGANS: ICD-10-CM

## 2022-01-01 DIAGNOSIS — R05.9 COUGH, UNSPECIFIED: ICD-10-CM

## 2022-01-01 DIAGNOSIS — J91.8 PNEUMONIA, UNSPECIFIED ORGANISM: ICD-10-CM

## 2022-01-01 DIAGNOSIS — F03.91 UNSPECIFIED DEMENTIA WITH BEHAVIORAL DISTURBANCE: ICD-10-CM

## 2022-01-01 DIAGNOSIS — I25.10 ATHEROSCLEROTIC HEART DISEASE OF NATIVE CORONARY ARTERY W/OUT ANGINA PECTORIS: ICD-10-CM

## 2022-01-01 DIAGNOSIS — J18.9 PNEUMONIA, UNSPECIFIED ORGANISM: ICD-10-CM

## 2022-01-01 DIAGNOSIS — R26.81 UNSTEADINESS ON FEET: ICD-10-CM

## 2022-01-01 DIAGNOSIS — J69.0 PNEUMONITIS DUE TO INHALATION OF FOOD AND VOMIT: ICD-10-CM

## 2022-01-01 DIAGNOSIS — G47.9 SLEEP DISORDER, UNSPECIFIED: ICD-10-CM

## 2022-01-01 LAB
25(OH)D3 SERPL-MCNC: 45.8 NG/ML
ALBUMIN SERPL ELPH-MCNC: 3.6 G/DL
ALBUMIN SERPL ELPH-MCNC: 4 G/DL
ALBUMIN SERPL ELPH-MCNC: 4.3 G/DL
ALP BLD-CCNC: 63 U/L
ALP BLD-CCNC: 64 U/L
ALP BLD-CCNC: 76 U/L
ALT SERPL-CCNC: 10 U/L
ALT SERPL-CCNC: 10 U/L
ALT SERPL-CCNC: 11 U/L
ANION GAP SERPL CALC-SCNC: 12 MMOL/L
ANION GAP SERPL CALC-SCNC: 13 MMOL/L
ANION GAP SERPL CALC-SCNC: 15 MMOL/L
AST SERPL-CCNC: 13 U/L
AST SERPL-CCNC: 14 U/L
AST SERPL-CCNC: 17 U/L
BASOPHILS # BLD AUTO: 0.04 K/UL
BASOPHILS # BLD AUTO: 0.04 K/UL
BASOPHILS # BLD AUTO: 0.05 K/UL
BASOPHILS NFR BLD AUTO: 0.5 %
BASOPHILS NFR BLD AUTO: 0.7 %
BASOPHILS NFR BLD AUTO: 0.7 %
BILIRUB SERPL-MCNC: 0.4 MG/DL
BILIRUB SERPL-MCNC: 0.5 MG/DL
BILIRUB SERPL-MCNC: 0.6 MG/DL
BUN SERPL-MCNC: 24 MG/DL
BUN SERPL-MCNC: 24 MG/DL
BUN SERPL-MCNC: 40 MG/DL
CALCIUM SERPL-MCNC: 9 MG/DL
CALCIUM SERPL-MCNC: 9.1 MG/DL
CALCIUM SERPL-MCNC: 9.9 MG/DL
CHLORIDE SERPL-SCNC: 100 MMOL/L
CHLORIDE SERPL-SCNC: 101 MMOL/L
CHLORIDE SERPL-SCNC: 102 MMOL/L
CHOLEST SERPL-MCNC: 119 MG/DL
CO2 SERPL-SCNC: 24 MMOL/L
CO2 SERPL-SCNC: 25 MMOL/L
CO2 SERPL-SCNC: 26 MMOL/L
CREAT SERPL-MCNC: 0.94 MG/DL
CREAT SERPL-MCNC: 1.04 MG/DL
CREAT SERPL-MCNC: 1.19 MG/DL
EGFR: 57 ML/MIN/1.73M2
EGFR: 67 ML/MIN/1.73M2
EGFR: 76 ML/MIN/1.73M2
EOSINOPHIL # BLD AUTO: 0.09 K/UL
EOSINOPHIL # BLD AUTO: 0.16 K/UL
EOSINOPHIL # BLD AUTO: 0.21 K/UL
EOSINOPHIL NFR BLD AUTO: 1.1 %
EOSINOPHIL NFR BLD AUTO: 2.4 %
EOSINOPHIL NFR BLD AUTO: 3.7 %
ESTIMATED AVERAGE GLUCOSE: 134 MG/DL
FOLATE SERPL-MCNC: 8.6 NG/ML
GLUCOSE SERPL-MCNC: 110 MG/DL
GLUCOSE SERPL-MCNC: 125 MG/DL
GLUCOSE SERPL-MCNC: 151 MG/DL
HBA1C MFR BLD HPLC: 6.3 %
HCT VFR BLD CALC: 34.3 %
HCT VFR BLD CALC: 34.6 %
HCT VFR BLD CALC: 36.4 %
HDLC SERPL-MCNC: 43 MG/DL
HGB BLD-MCNC: 11.1 G/DL
HGB BLD-MCNC: 11.1 G/DL
HGB BLD-MCNC: 11.4 G/DL
IMM GRANULOCYTES NFR BLD AUTO: 0.2 %
IMM GRANULOCYTES NFR BLD AUTO: 0.6 %
IMM GRANULOCYTES NFR BLD AUTO: 0.7 %
LDLC SERPL CALC-MCNC: 57 MG/DL
LYMPHOCYTES # BLD AUTO: 1.34 K/UL
LYMPHOCYTES # BLD AUTO: 1.61 K/UL
LYMPHOCYTES # BLD AUTO: 1.7 K/UL
LYMPHOCYTES NFR BLD AUTO: 20.6 %
LYMPHOCYTES NFR BLD AUTO: 23.7 %
LYMPHOCYTES NFR BLD AUTO: 23.8 %
MAN DIFF?: NORMAL
MCHC RBC-ENTMCNC: 30.4 PG
MCHC RBC-ENTMCNC: 30.8 PG
MCHC RBC-ENTMCNC: 30.9 PG
MCHC RBC-ENTMCNC: 31.3 GM/DL
MCHC RBC-ENTMCNC: 32.1 GM/DL
MCHC RBC-ENTMCNC: 32.4 GM/DL
MCV RBC AUTO: 95.3 FL
MCV RBC AUTO: 96.4 FL
MCV RBC AUTO: 97.1 FL
MONOCYTES # BLD AUTO: 0.57 K/UL
MONOCYTES # BLD AUTO: 0.8 K/UL
MONOCYTES # BLD AUTO: 0.87 K/UL
MONOCYTES NFR BLD AUTO: 10.1 %
MONOCYTES NFR BLD AUTO: 12.8 %
MONOCYTES NFR BLD AUTO: 9.7 %
NEUTROPHILS # BLD AUTO: 3.44 K/UL
NEUTROPHILS # BLD AUTO: 4.06 K/UL
NEUTROPHILS # BLD AUTO: 5.59 K/UL
NEUTROPHILS NFR BLD AUTO: 59.8 %
NEUTROPHILS NFR BLD AUTO: 61 %
NEUTROPHILS NFR BLD AUTO: 67.9 %
NONHDLC SERPL-MCNC: 75 MG/DL
PLATELET # BLD AUTO: 207 K/UL
PLATELET # BLD AUTO: 221 K/UL
PLATELET # BLD AUTO: 232 K/UL
POTASSIUM SERPL-SCNC: 4.1 MMOL/L
POTASSIUM SERPL-SCNC: 4.5 MMOL/L
POTASSIUM SERPL-SCNC: 4.5 MMOL/L
PROT SERPL-MCNC: 6.8 G/DL
PROT SERPL-MCNC: 7.3 G/DL
PROT SERPL-MCNC: 7.6 G/DL
RBC # BLD: 3.59 M/UL
RBC # BLD: 3.6 M/UL
RBC # BLD: 3.75 M/UL
RBC # FLD: 14.6 %
RBC # FLD: 15.3 %
RBC # FLD: 16.6 %
SARS-COV-2 N GENE NPH QL NAA+PROBE: NOT DETECTED
SODIUM SERPL-SCNC: 138 MMOL/L
SODIUM SERPL-SCNC: 139 MMOL/L
SODIUM SERPL-SCNC: 141 MMOL/L
TRIGL SERPL-MCNC: 88 MG/DL
TSH SERPL-ACNC: 1.74 UIU/ML
URATE SERPL-MCNC: 5.3 MG/DL
VIT B12 SERPL-MCNC: 1864 PG/ML
WBC # FLD AUTO: 5.64 K/UL
WBC # FLD AUTO: 6.79 K/UL
WBC # FLD AUTO: 8.24 K/UL

## 2022-01-01 PROCEDURE — 99349 HOME/RES VST EST MOD MDM 40: CPT

## 2022-01-01 PROCEDURE — 99344 HOME/RES VST NEW MOD MDM 60: CPT

## 2022-01-01 PROCEDURE — 99497 ADVNCD CARE PLAN 30 MIN: CPT

## 2022-01-01 RX ORDER — MOXIFLOXACIN HYDROCHLORIDE TABLETS, 400 MG 400 MG/1
400 TABLET, FILM COATED ORAL
Qty: 7 | Refills: 0 | Status: ACTIVE | COMMUNITY
Start: 2022-01-01 | End: 1900-01-01

## 2022-01-01 RX ORDER — ACETAMINOPHEN/DIPHENHYDRAMINE 500MG-25MG
1000 TABLET ORAL DAILY
Qty: 30 | Refills: 6 | Status: ACTIVE | COMMUNITY
Start: 1900-01-01 | End: 1900-01-01

## 2022-01-01 RX ORDER — LATANOPROST/PF 0.005 %
0.01 DROPS OPHTHALMIC (EYE) AT BEDTIME
Qty: 3 | Refills: 3 | Status: ACTIVE | COMMUNITY
Start: 2022-01-01 | End: 1900-01-01

## 2022-01-01 RX ORDER — DOCUSATE SODIUM 50 MG AND SENNOSIDES 8.6 MG 8.6; 5 MG/1; MG/1
8.6-5 TABLET, FILM COATED ORAL
Qty: 120 | Refills: 3 | Status: ACTIVE | COMMUNITY
Start: 2022-01-01 | End: 1900-01-01

## 2022-01-01 RX ORDER — LORATADINE 10 MG
17 TABLET,DISINTEGRATING ORAL TWICE DAILY
Qty: 3 | Refills: 5 | Status: ACTIVE | COMMUNITY
Start: 2021-01-01

## 2022-01-01 RX ORDER — FUROSEMIDE 20 MG/1
20 TABLET ORAL DAILY
Qty: 30 | Refills: 3 | Status: DISCONTINUED | COMMUNITY
Start: 2022-01-01 | End: 2022-01-01

## 2022-01-01 RX ORDER — PANTOPRAZOLE 40 MG/1
40 TABLET, DELAYED RELEASE ORAL
Qty: 90 | Refills: 3 | Status: ACTIVE | COMMUNITY
Start: 2022-01-01

## 2022-01-01 RX ORDER — AMLODIPINE BESYLATE 5 MG/1
5 TABLET ORAL DAILY
Qty: 90 | Refills: 3 | Status: ACTIVE | COMMUNITY
Start: 2022-01-01

## 2022-01-01 RX ORDER — RAMELTEON 8 MG/1
8 TABLET ORAL
Qty: 90 | Refills: 2 | Status: ACTIVE | COMMUNITY
Start: 2022-01-01 | End: 1900-01-01

## 2022-01-01 RX ORDER — FUROSEMIDE 20 MG/1
20 TABLET ORAL
Qty: 90 | Refills: 2 | Status: ACTIVE | COMMUNITY
Start: 2022-01-01 | End: 1900-01-01

## 2022-01-01 RX ORDER — DORZOLAMIDE HYDROCHLORIDE TIMOLOL MALEATE 20; 5 MG/ML; MG/ML
22.3-6.8 SOLUTION/ DROPS OPHTHALMIC DAILY
Qty: 3 | Refills: 3 | Status: ACTIVE | COMMUNITY
Start: 2022-01-01 | End: 1900-01-01

## 2022-01-01 RX ORDER — RAMELTEON 8 MG/1
8 TABLET ORAL
Qty: 30 | Refills: 3 | Status: DISCONTINUED | COMMUNITY
Start: 2022-01-01 | End: 2022-01-01

## 2022-03-07 PROBLEM — Z71.89 ADVANCE CARE PLANNING: Status: ACTIVE | Noted: 2022-01-01

## 2022-03-07 PROBLEM — R33.8 ACUTE URINARY RETENTION: Status: RESOLVED | Noted: 2021-01-01 | Resolved: 2022-01-01

## 2022-03-07 NOTE — CURRENT MEDS
[Medication and Allergies Reconciled] : medication and allergies reconciled [High Risk Medications Reviewed and Reconciled (Beers Criteria)] : high risk medications reviewed and reconciled [Reviewed patient reported medication adherence from Comprehensive Assessment] : Reviewed patient reported medication adherence from comprehensive assessment [Adherent to medications] : Patient is adherent to medications as prescribed [de-identified] : Needs assistance with medications

## 2022-03-07 NOTE — HISTORY OF PRESENT ILLNESS
[FreeTextEntry2] : 03/07/2022 COVID SCREEN:\par Patient or caretaker denies fever, cough, trouble breathing, rash, vomiting. Patient has not been in close contact with anyone who is COVID-19 positive, or suspected of having COVID-19.\par \par N95 mask, gloves, eye wear and gown (if indicated) used during visit: Yes.\par \par Total face to face time with patient is 45 min.\par \par MARITZA WEAVER is an 89 year with Hypertension, CAD s/p 2 stents, Dementia with behavior disturbance, multiple falls, unsteady gait, constipation seen today for initial home visit.\par \par Accompanying patient today is daughter/HCP Nuha Lilly [phone] and HHA Mr Lawler.\par \par Patient's has no recent hospital admissions but multiple ER visits for falls at home. Patient previously was living at [address] [place] [address], but due to Covid- it was difficult for family to visit patient. As per daughter there is a steady decline in patient's cognition. Patient has been noted to have increase in sleep disturbance, at times agitation. Patient is incontinent with urine, but still request assistance to use bathroom for bowel movements.\par \par Patient 24hr care. \par \par Patient/ patient's caregiver reports no weight loss >10 lbs in the past 6 months. No changes in dentition or swallowing reported, No changes in hearing or vision reported. Patient denies any symptoms of depression or anxiety. Patient is ADL dependent and IADL dependent. \par \par Patient's home environment is safe.\par  \par Goals of care discussed. MOLST completed. \par \par

## 2022-03-07 NOTE — COUNSELING
[Normal Weight - ( BMI  <25 )] : normal weight - ( BMI  <25 ) [TLC diet recommended] : TLC diet recommended [Non - Smoker] : non-smoker [Use assistive device to avoid falls] : use assistive device to avoid falls [Remove clutter and unsafe carpeting to avoid falls] : remove clutter and unsafe carpeting to avoid falls [] : foot exam [Not Recommended] : Aspirin use not recommended due to overall prognosis [Decrease hospital use] : decrease hospital use [Minimize unnecessary interventions] : minimize unnecessary interventions [Discussed disease trajectory with patient/caregiver] : discussed disease trajectory with patient/caregiver [Completed Medical Orders for Life-Sustaining Treatment] : completed medical orders for life-sustaining treatment [DNR] : Code Status: DNR [Comfort] : Treatment Guidelines: Comfort [DNI] : Intubation: DNI [Last Verification Date: _____] : [facility] Completion/last verification date: [unfilled] [_____] : HCP: [unfilled] [FreeTextEntry3] : Discussed thickened fluid

## 2022-03-07 NOTE — REASON FOR VISIT
[Initial Evaluation] : an initial evaluation [Family Member] : family member [Pre-Visit Preparation] : pre-visit preparation was done [Intercurrent Specialty/Sub-specialty Visits] : the patient has intercurrent specialty/sub-specialty visits [FreeTextEntry2] : Chart Review [FreeTextEntry1] : Hypertension, Constipation, Urinary Retention, dyslipidemia, Glaucoma, Cognitive decline [FreeTextEntry3] : Neurology

## 2022-03-07 NOTE — PHYSICAL EXAM
[No Acute Distress] : no acute distress [Well Nourished] : well nourished [Normal Sclera/Conjunctiva] : normal sclera/conjunctiva [PERRL] : pupils equal, round and reactive to light [EOMI] : extra ocular movement intact [Normal Outer Ear/Nose] : the ears and nose were normal in appearance [Normal Oropharynx] : the oropharynx was normal [No JVD] : no jugular venous distention [Supple] : the neck was supple [No Respiratory Distress] : no respiratory distress [Clear to Auscultation] : lungs were clear to auscultation bilaterally [No Accessory Muscle Use] : no accessory muscle use [Normal Rate] : heart rate was normal  [Normal Supraclavicular Nodes] : no supraclavicular lymphadenopathy [Normal Post Cervical Nodes] : no posterior cervical lymphadenopathy [Normal Anterior Cervical Nodes] : no anterior cervical lymphadenopathy [No Spinal Tenderness] : no spinal tenderness [Kyphosis] :  kyphosis present [No Joint Swelling] : no joint swelling seen [No Gross Sensory Deficits] : no gross sensory deficits [Foot Ulcers] : no foot ulcers [de-identified] : unsteady gait, one person assist, uses walker [de-identified] : Demented, guarded, becomes agitated at times [de-identified] : right hip skin tear healing

## 2022-03-07 NOTE — HEALTH RISK ASSESSMENT
[HRA Reviewed] : Health risk assessment reviewed [Full assistance needed] : managing finances [Two or more falls in past year] : Patient reported two or more falls in the past year [No] : The patient does not have visual impairment [TimeGetUpGo] : 0 [de-identified] : Patient needs assistance walking, also uses walker - cannot perform get up and go test

## 2022-03-07 NOTE — CHRONIC CARE ASSESSMENT
[Limited decision making ability] : limited decision making ability [PPS Score: ____] : Palliative Performance Scale (PPS) Score: [unfilled] [FAST Score: ____] : Functional Assessment Scale (FAST) Score: [unfilled] [de-identified] : Regular diet- found to cough with water- discussed thicken liquids, nectar thick fluids [de-identified] : Cannot due to disability

## 2022-03-07 NOTE — REVIEW OF SYSTEMS
[Nocturia] : nocturia [Back Pain] : back pain [Confusion] : confusion [Unsteady Walk] : ataxia [Memory Loss] : memory loss [Insomnia] : insomnia [Negative] : Heme/Lymph

## 2022-03-28 NOTE — HISTORY OF PRESENT ILLNESS
[FreeTextEntry2] : 03/28/2022 COVID SCREEN:\par Patient or caretaker denies fever, cough, trouble breathing, rash, vomiting. Patient has not been in close contact with anyone who is COVID-19 positive, or suspected of having COVID-19.\par \par N95 mask, gloves, eye wear and gown (if indicated) used during visit: Yes.\par \par Total face to face time with patient is 45 min.\par \par  \par MARITZA WEAVER is an 89 year with Hypertension, CAD s/p 2 stents, Dementia with behavior disturbance, multiple falls, unsteady gait, constipation seen today for follow up home visit.\par \par Accompanying patient today is daughter/HCP Nuha Lilly [phone] and A Mr Lawler.\par \par In the interim patient was treated for aspiration pneumonia- patient improved with antibiotics. Patient now on thick it with decreased episodes of coughing with meals.\par \par Patient 24hr care. \par \par Patient/ patient's caregiver reports no weight loss >10 lbs in the past 6 months. No changes in dentition or swallowing reported, No changes in hearing or vision reported. Patient denies any symptoms of depression or anxiety. Patient is ADL dependent and IADL dependent. \par \par \par

## 2022-03-28 NOTE — PHYSICAL EXAM
[No Acute Distress] : no acute distress [Well Nourished] : well nourished [Normal Sclera/Conjunctiva] : normal sclera/conjunctiva [PERRL] : pupils equal, round and reactive to light [EOMI] : extra ocular movement intact [Normal Outer Ear/Nose] : the ears and nose were normal in appearance [Normal Oropharynx] : the oropharynx was normal [No JVD] : no jugular venous distention [Supple] : the neck was supple [No Respiratory Distress] : no respiratory distress [Clear to Auscultation] : lungs were clear to auscultation bilaterally [No Accessory Muscle Use] : no accessory muscle use [Normal Rate] : heart rate was normal  [Normal Supraclavicular Nodes] : no supraclavicular lymphadenopathy [Normal Post Cervical Nodes] : no posterior cervical lymphadenopathy [Normal Anterior Cervical Nodes] : no anterior cervical lymphadenopathy [No Spinal Tenderness] : no spinal tenderness [Kyphosis] :  kyphosis present [No Joint Swelling] : no joint swelling seen [No Gross Sensory Deficits] : no gross sensory deficits [Foot Ulcers] : no foot ulcers [de-identified] : unsteady gait, one person assist, uses walker [de-identified] : right hip skin tear healing [de-identified] : Demented, guarded, becomes agitated at times

## 2022-03-28 NOTE — CURRENT MEDS
[Medication and Allergies Reconciled] : medication and allergies reconciled [High Risk Medications Reviewed and Reconciled (Beers Criteria)] : high risk medications reviewed and reconciled [Reviewed patient reported medication adherence from Comprehensive Assessment] : Reviewed patient reported medication adherence from comprehensive assessment [Adherent to medications] : Patient is adherent to medications as prescribed [de-identified] : Needs assistance with medications

## 2022-03-28 NOTE — CHRONIC CARE ASSESSMENT
[PPS Score: ____] : Palliative Performance Scale (PPS) Score: [unfilled] [FAST Score: ____] : Functional Assessment Scale (FAST) Score: [unfilled] [Limited decision making ability] : limited decision making ability [de-identified] : Cannot due to disability [de-identified] : Regular diet- found to cough with water- discussed thicken liquids, nectar thick fluids

## 2022-03-28 NOTE — COUNSELING
[Normal Weight - ( BMI  <25 )] : normal weight - ( BMI  <25 ) [TLC diet recommended] : TLC diet recommended [Non - Smoker] : non-smoker [Use assistive device to avoid falls] : use assistive device to avoid falls [Remove clutter and unsafe carpeting to avoid falls] : remove clutter and unsafe carpeting to avoid falls [] : foot exam [Not Recommended] : Aspirin use not recommended due to overall prognosis [Decrease hospital use] : decrease hospital use [Minimize unnecessary interventions] : minimize unnecessary interventions [Discussed disease trajectory with patient/caregiver] : discussed disease trajectory with patient/caregiver [Completed Medical Orders for Life-Sustaining Treatment] : completed medical orders for life-sustaining treatment [DNR] : Code Status: DNR [Comfort] : Treatment Guidelines: Comfort [DNI] : Intubation: DNI [Last Ver[facility] Date: _____] [facility] Com[facility]/last verification date: [unfilled] [_____] : HCP: [unfilled] [FreeTextEntry3] : Discussed thickened fluid

## 2022-03-28 NOTE — REASON FOR VISIT
[Initial Evaluation] : an initial evaluation [Family Member] : family member [Pre-Visit Preparation] : pre-visit preparation was done [Intercurrent Specialty/Sub-specialty Visits] : the patient has intercurrent specialty/sub-specialty visits [FreeTextEntry1] : Hypertension, Constipation, Urinary Retention, dyslipidemia, Glaucoma, Cognitive decline [FreeTextEntry2] : Chart Review [FreeTextEntry3] : Neurology

## 2022-03-28 NOTE — HEALTH RISK ASSESSMENT
[HRA Reviewed] : Health risk assessment reviewed [Full assistance needed] : managing finances [Two or more falls in past year] : Patient reported two or more falls in the past year [No] : The patient does not have visual impairment [TimeGetUpGo] : 0 [de-identified] : Patient needs assistance walking, also uses walker - cannot perform get up and go test

## 2022-04-18 PROBLEM — R13.12 DYSPHAGIA, OROPHARYNGEAL: Status: ACTIVE | Noted: 2022-01-01

## 2022-04-18 PROBLEM — R05.9 COUGH: Status: ACTIVE | Noted: 2022-01-01

## 2022-04-18 PROBLEM — H40.9 GLAUCOMA OF LEFT EYE, UNSPECIFIED GLAUCOMA TYPE: Status: ACTIVE | Noted: 2022-01-01

## 2022-04-18 PROBLEM — R26.81 UNSTEADY GAIT: Status: ACTIVE | Noted: 2022-01-01

## 2022-04-18 PROBLEM — G47.9 SLEEP DISTURBANCE: Status: ACTIVE | Noted: 2022-01-01

## 2022-04-18 NOTE — HISTORY OF PRESENT ILLNESS
[FreeTextEntry2] : 03/28/2022 COVID SCREEN:\par Patient or caretaker denies fever, cough, trouble breathing, rash, vomiting. Patient has not been in close contact with anyone who is COVID-19 positive, or suspected of having COVID-19.\par \par N95 mask, gloves, eye wear and gown (if indicated) used during visit: Yes.\par \par Total face to face time with patient is 45 min.\par \par  \par MARITZA WEAVER is an 89 year with Hypertension, CAD s/p 2 stents, Dementia with behavior disturbance, multiple falls, unsteady gait, constipation seen today for an acute home visit.  Seen sitting in chair with no evidence of cardiovascular or respiratory distress.  Demented per baseline.  Unable to verbalize needs or concerns.  HHA present denies respiratory issue.  Chest x-ray and labs pending.  On Moxifloxacin.  \par \par Accompanying patient today is daughter/HCP Nuha Lilly [phone] and HHA Mr Lawler.\par \par In the interim patient was treated for aspiration pneumonia- patient improved with antibiotics. Patient now on thick it with decreased episodes of coughing with meals.\par \par Patient 24hr care. \par \par Patient/ patient's caregiver reports no weight loss >10 lbs in the past 6 months. No changes in dentition or swallowing reported, No changes in hearing or vision reported. Patient denies any symptoms of depression or anxiety. Patient is ADL dependent and IADL dependent. \par \par \par

## 2022-04-18 NOTE — HEALTH RISK ASSESSMENT
[TimeGetUpGo] : 0 [de-identified] : Patient needs assistance walking, also uses walker - cannot perform get up and go test

## 2022-04-18 NOTE — PHYSICAL EXAM
[Foot Ulcers] : no foot ulcers [de-identified] : Poor inspiratory effort.  Possible crackles heard.  Pending chest x-ray [de-identified] : unsteady gait, one person assist, uses walker [de-identified] : right hip skin tear healing [de-identified] : Demented, guarded, becomes agitated at times

## 2022-04-18 NOTE — REASON FOR VISIT
[Acute] : an acute visit [FreeTextEntry1] : Hypertension, Constipation, Urinary Retention, dyslipidemia, Glaucoma, Cognitive decline [FreeTextEntry2] : Chart Review [FreeTextEntry3] : Neurology

## 2022-04-18 NOTE — CHRONIC CARE ASSESSMENT
[de-identified] : Cannot due to disability [de-identified] : Regular diet- found to cough with water- discussed thicken liquids, nectar thick fluids

## 2022-05-09 NOTE — HEALTH RISK ASSESSMENT
[HRA Reviewed] : Health risk assessment reviewed [Full assistance needed] : managing finances [Two or more falls in past year] : Patient reported two or more falls in the past year [No] : The patient does not have visual impairment [TimeGetUpGo] : 0 [de-identified] : Patient needs assistance walking, also uses walker - cannot perform get up and go test

## 2022-05-09 NOTE — COUNSELING
[Normal Weight - ( BMI  <25 )] : normal weight - ( BMI  <25 ) [TLC diet recommended] : TLC diet recommended [Non - Smoker] : non-smoker [Use assistive device to avoid falls] : use assistive device to avoid falls [Remove clutter and unsafe carpeting to avoid falls] : remove clutter and unsafe carpeting to avoid falls [] : foot exam [Not Recommended] : Aspirin use not recommended due to overall prognosis [Decrease hospital use] : decrease hospital use [Minimize unnecessary interventions] : minimize unnecessary interventions [Discussed disease trajectory with patient/caregiver] : discussed disease trajectory with patient/caregiver [Completed Medical Orders for Life-Sustaining Treatment] : completed medical orders for life-sustaining treatment [DNR] : Code Status: DNR [Comfort] : Treatment Guidelines: Comfort [DNI] : Intubation: DNI [[facility] Ver[facility] Date: _____] : [facility]/last verification date: [unfilled] [_____] : HCP: [unfilled] [FreeTextEntry3] : Discussed thickened fluid

## 2022-05-09 NOTE — CHRONIC CARE ASSESSMENT
[PPS Score: ____] : Palliative Performance Scale (PPS) Score: [unfilled] [FAST Score: ____] : Functional Assessment Scale (FAST) Score: [unfilled] [Limited decision making ability] : limited decision making ability [de-identified] : Cannot due to disability [de-identified] : Regular diet- found to cough with water- discussed thicken liquids, nectar thick fluids

## 2022-05-09 NOTE — REASON FOR VISIT
[Follow-Up] : a follow-up visit [Family Member] : family member [Pre-Visit Preparation] : pre-visit preparation was done [Intercurrent Specialty/Sub-specialty Visits] : the patient has intercurrent specialty/sub-specialty visits [FreeTextEntry1] : Hypertension, Constipation, Urinary Retention, dyslipidemia, Glaucoma, Cognitive decline [FreeTextEntry2] : Chart Review [FreeTextEntry3] : Neurology

## 2022-05-09 NOTE — PHYSICAL EXAM
[No Acute Distress] : no acute distress [Well Nourished] : well nourished [Normal Sclera/Conjunctiva] : normal sclera/conjunctiva [PERRL] : pupils equal, round and reactive to light [EOMI] : extra ocular movement intact [Normal Outer Ear/Nose] : the ears and nose were normal in appearance [Normal Oropharynx] : the oropharynx was normal [No JVD] : no jugular venous distention [Supple] : the neck was supple [No Respiratory Distress] : no respiratory distress [Clear to Auscultation] : lungs were clear to auscultation bilaterally [No Accessory Muscle Use] : no accessory muscle use [Normal Rate] : heart rate was normal  [Normal Supraclavicular Nodes] : no supraclavicular lymphadenopathy [Normal Post Cervical Nodes] : no posterior cervical lymphadenopathy [Normal Anterior Cervical Nodes] : no anterior cervical lymphadenopathy [No Spinal Tenderness] : no spinal tenderness [Kyphosis] :  kyphosis present [No Joint Swelling] : no joint swelling seen [No Gross Sensory Deficits] : no gross sensory deficits [Foot Ulcers] : no foot ulcers [de-identified] : unsteady gait, one person assist, uses walker [de-identified] : right hip skin tear healing [de-identified] : Demented, guarded, becomes agitated at times

## 2022-05-09 NOTE — CURRENT MEDS
[Medication and Allergies Reconciled] : medication and allergies reconciled [High Risk Medications Reviewed and Reconciled (Beers Criteria)] : high risk medications reviewed and reconciled [Reviewed patient reported medication adherence from Comprehensive Assessment] : Reviewed patient reported medication adherence from comprehensive assessment [Adherent to medications] : Patient is adherent to medications as prescribed [de-identified] : Needs assistance with medications

## 2022-05-09 NOTE — HISTORY OF PRESENT ILLNESS
[FreeTextEntry2] : 05/09/2022 COVID SCREEN:\par Patient or caretaker denies fever, cough, trouble breathing, rash, vomiting. Patient has not been in close contact with anyone who is COVID-19 positive, or suspected of having COVID-19.\par \par N95 mask, gloves, eye wear and gown (if indicated) used during visit: Yes.\par \par Total face to face time with patient is 45 min.\par \par  \par  \par MARITZA WEAVER is an 89 year with Hypertension, CAD s/p 2 stents, Dementia with behavior disturbance, multiple falls, unsteady gait, constipation seen today for follow up home visit.\par \par Accompanying patient today is daughter/HCP Nuha Lilly [phone] and HHA Mr Lawler.\par \par In the interim patient was treated for aspiration pneumonia- patient improved with antibiotics. Patient now on thick it with decreased episodes of coughing with meals.\par \par No new events reported.\par \par Patient 24hr care. \par \par Patient/ patient's caregiver reports no weight loss >10 lbs in the past 6 months. No changes in dentition or swallowing reported, No changes in hearing or vision reported. Patient denies any symptoms of depression or anxiety. Patient is ADL dependent and IADL dependent. \par \par \par

## 2022-06-20 PROBLEM — K59.00 CONSTIPATION: Status: ACTIVE | Noted: 2021-01-01

## 2022-06-20 NOTE — HISTORY OF PRESENT ILLNESS
[FreeTextEntry2] : 06/20/2022 COVID SCREEN:\par Patient or caretaker denies fever, cough, trouble breathing, rash, vomiting. Patient has not been in close contact with anyone who is COVID-19 positive, or suspected of having COVID-19.\par \par N95 mask, gloves, eye wear and gown (if indicated) used during visit: Yes.\par \par Total face to face time with patient is 45 min.\par \par   \par MARITZA WEAVER is an 89 year with Hypertension, CAD s/p 2 stents, Dementia with behavior disturbance, multiple falls, unsteady gait, constipation seen today for follow up home visit.\par \par Accompanying patient today is HHA Mr Nuris, daughter Nuha called via telephone.\par \par No new events reported.\par \par Patient 24hr care. \par \par Patient/ patient's caregiver reports no weight loss >10 lbs in the past 6 months. No changes in dentition or swallowing reported, No changes in hearing or vision reported. Patient denies any symptoms of depression or anxiety. Patient is ADL dependent and IADL dependent. \par \par \par

## 2022-06-20 NOTE — CHRONIC CARE ASSESSMENT
[PPS Score: ____] : Palliative Performance Scale (PPS) Score: [unfilled] [FAST Score: ____] : Functional Assessment Scale (FAST) Score: [unfilled] [Limited decision making ability] : limited decision making ability [de-identified] : Cannot due to disability [de-identified] : Regular diet- found to cough with water- discussed thicken liquids, nectar thick fluids

## 2022-06-20 NOTE — PHYSICAL EXAM
[No Acute Distress] : no acute distress [Well Nourished] : well nourished [Normal Sclera/Conjunctiva] : normal sclera/conjunctiva [PERRL] : pupils equal, round and reactive to light [EOMI] : extra ocular movement intact [Normal Outer Ear/Nose] : the ears and nose were normal in appearance [Normal Oropharynx] : the oropharynx was normal [No JVD] : no jugular venous distention [Supple] : the neck was supple [No Respiratory Distress] : no respiratory distress [Clear to Auscultation] : lungs were clear to auscultation bilaterally [No Accessory Muscle Use] : no accessory muscle use [Normal Rate] : heart rate was normal  [Normal Supraclavicular Nodes] : no supraclavicular lymphadenopathy [Normal Post Cervical Nodes] : no posterior cervical lymphadenopathy [Normal Anterior Cervical Nodes] : no anterior cervical lymphadenopathy [No Spinal Tenderness] : no spinal tenderness [Kyphosis] :  kyphosis present [No Joint Swelling] : no joint swelling seen [No Gross Sensory Deficits] : no gross sensory deficits [Foot Ulcers] : no foot ulcers [de-identified] : unsteady gait, one person assist, uses walker [de-identified] : right hip skin tear healing [de-identified] : Demented, guarded, becomes agitated at times

## 2022-06-20 NOTE — CURRENT MEDS
[Medication and Allergies Reconciled] : medication and allergies reconciled [High Risk Medications Reviewed and Reconciled (Beers Criteria)] : high risk medications reviewed and reconciled [Reviewed patient reported medication adherence from Comprehensive Assessment] : Reviewed patient reported medication adherence from comprehensive assessment [Adherent to medications] : Patient is adherent to medications as prescribed [de-identified] : Needs assistance with medications

## 2022-06-20 NOTE — HEALTH RISK ASSESSMENT
[HRA Reviewed] : Health risk assessment reviewed [Full assistance needed] : managing finances [Two or more falls in past year] : Patient reported two or more falls in the past year [No] : The patient does not have visual impairment [TimeGetUpGo] : 0 [de-identified] : Patient needs assistance walking, also uses walker - cannot perform get up and go test

## 2022-06-20 NOTE — COUNSELING
[Normal Weight - ( BMI  <25 )] : normal weight - ( BMI  <25 ) [TLC diet recommended] : TLC diet recommended [Non - Smoker] : non-smoker [Use assistive device to avoid falls] : use assistive device to avoid falls [Remove clutter and unsafe carpeting to avoid falls] : remove clutter and unsafe carpeting to avoid falls [] : foot exam [Not Recommended] : Aspirin use not recommended due to overall prognosis [Decrease hospital use] : decrease hospital use [Minimize unnecessary interventions] : minimize unnecessary interventions [Discussed disease trajectory with patient/caregiver] : discussed disease trajectory with patient/caregiver [Completed Medical Orders for Life-Sustaining Treatment] : completed medical orders for life-sustaining treatment [DNR] : Code Status: DNR [Comfort] : Treatment Guidelines: Comfort [DNI] : Intubation: DNI [[facility] Date: _____] [facility]/last verification date: [unfilled] [_____] : HCP: [unfilled] [FreeTextEntry3] : Discussed thickened fluid

## 2022-08-10 PROBLEM — Z86.69 HISTORY OF ACUTE CONJUNCTIVITIS: Status: RESOLVED | Noted: 2022-01-01 | Resolved: 2022-01-01

## 2022-08-10 PROBLEM — J18.9 PLEURAL EFFUSION ASSOCIATED WITH PULMONARY INFECTION: Status: RESOLVED | Noted: 2022-01-01 | Resolved: 2022-01-01

## 2022-08-10 NOTE — CHRONIC CARE ASSESSMENT
[PPS Score: ____] : Palliative Performance Scale (PPS) Score: [unfilled] [FAST Score: ____] : Functional Assessment Scale (FAST) Score: [unfilled] [Limited decision making ability] : limited decision making ability [de-identified] : Cannot due to disability [de-identified] : Regular diet- found to cough with water- discussed thicken liquids, nectar thick fluids

## 2022-08-10 NOTE — CURRENT MEDS
[Medication and Allergies Reconciled] : medication and allergies reconciled [High Risk Medications Reviewed and Reconciled (Beers Criteria)] : high risk medications reviewed and reconciled [Reviewed patient reported medication adherence from Comprehensive Assessment] : Reviewed patient reported medication adherence from comprehensive assessment [Adherent to medications] : Patient is adherent to medications as prescribed [de-identified] : Needs assistance with medications

## 2022-08-10 NOTE — HISTORY OF PRESENT ILLNESS
[FreeTextEntry2] : 08/10/2022 COVID SCREEN:\par Patient or caretaker denies fever, cough, trouble breathing, rash, vomiting. Patient has not been in close contact with anyone who is COVID-19 positive, or suspected of having COVID-19.\par \par N95 mask, gloves, eye wear and gown (if indicated) used during visit: Yes.\par \par Total face to face time with patient is 45 min.\par \par MARITZA WEAVER is an 89 year with Hypertension, CAD s/p 2 stents, Dementia with behavior disturbance, multiple falls, unsteady gait, constipation seen today for follow up home visit.\par \par Accompanying patient today is HHA Mr Nuris, daughter Nuha called via telephone.\par \par No new events reported.\par \par Patient 24hr care. \par \par Patient/ patient's caregiver reports no weight loss >10 lbs in the past 6 months. No changes in dentition or swallowing reported, No changes in hearing or vision reported. Patient denies any symptoms of depression or anxiety. Patient is ADL dependent and IADL dependent. \par \par \par

## 2022-08-10 NOTE — COUNSELING
[Normal Weight - ( BMI  <25 )] : normal weight - ( BMI  <25 ) [TLC diet recommended] : TLC diet recommended [Non - Smoker] : non-smoker [Use assistive device to avoid falls] : use assistive device to avoid falls [Remove clutter and unsafe carpeting to avoid falls] : remove clutter and unsafe carpeting to avoid falls [] : foot exam [Not Recommended] : Aspirin use not recommended due to overall prognosis [Decrease hospital use] : decrease hospital use [Minimize unnecessary interventions] : minimize unnecessary interventions [Discussed disease trajectory with patient/caregiver] : discussed disease trajectory with patient/caregiver [Completed Medical Orders for Life-Sustaining Treatment] : completed medical orders for life-sustaining treatment [DNR] : Code Status: DNR [Comfort] : Treatment Guidelines: Comfort [DNI] : Intubation: DNI [[facility] Date: _____] [facility]last verification date: [unfilled] [_____] : HCP: [unfilled] [FreeTextEntry3] : Discussed thickened fluid

## 2022-08-10 NOTE — PHYSICAL EXAM
[No Acute Distress] : no acute distress [Well Nourished] : well nourished [Normal Sclera/Conjunctiva] : normal sclera/conjunctiva [PERRL] : pupils equal, round and reactive to light [EOMI] : extra ocular movement intact [Normal Outer Ear/Nose] : the ears and nose were normal in appearance [Normal Oropharynx] : the oropharynx was normal [No JVD] : no jugular venous distention [Supple] : the neck was supple [No Respiratory Distress] : no respiratory distress [Clear to Auscultation] : lungs were clear to auscultation bilaterally [No Accessory Muscle Use] : no accessory muscle use [Normal Rate] : heart rate was normal  [Normal Supraclavicular Nodes] : no supraclavicular lymphadenopathy [Normal Post Cervical Nodes] : no posterior cervical lymphadenopathy [Normal Anterior Cervical Nodes] : no anterior cervical lymphadenopathy [No Spinal Tenderness] : no spinal tenderness [Kyphosis] :  kyphosis present [No Joint Swelling] : no joint swelling seen [No Gross Sensory Deficits] : no gross sensory deficits [Foot Ulcers] : no foot ulcers [de-identified] : unsteady gait, one person assist, uses walker [de-identified] : right hip skin tear healing [de-identified] : Demented, guarded, becomes agitated at times

## 2022-08-10 NOTE — HEALTH RISK ASSESSMENT
[HRA Reviewed] : Health risk assessment reviewed [Full assistance needed] : managing finances [Two or more falls in past year] : Patient reported two or more falls in the past year [No] : The patient does not have visual impairment [TimeGetUpGo] : 0 [de-identified] : Patient needs assistance walking, also uses walker - cannot perform get up and go test

## 2022-08-11 NOTE — PROGRESS NOTE ADULT - PROBLEM SELECTOR PLAN 4
Hgb 10.1, stable from Hgb 10.8 on 10/6 from OSH. No active bleed on exam. Iron studies done 10/8 at OSH w/ iron level 50, TIBC 179 (low), iron sat 27%, ferritin 443 (high) consistent with anemia of chronic disease.  - Monitor CBC  - Transfuse for Hgb <7.
Dr. Low
Hgb 10.1, stable from Hgb 10.8 on 10/6 from OSH. No active bleed on exam. Iron studies done 10/8 at OSH w/ iron level 50, TIBC 179 (low), iron sat 27%, ferritin 443 (high) consistent with anemia of chronic disease.  - Monitor CBC  - Transfuse for Hgb <7.
Hgb 10.1, stable from Hgb 10.8 on 10/6 from OSH. No active bleed on exam. Iron studies done 10/8 at OSH w/ iron level 50, TIBC 179 (low), iron sat 27%, ferritin 443 (high) consistent with anemia of chronic disease.  - Monitor CBC  - Transfuse for Hgb <7.

## 2022-09-19 PROBLEM — J69.0 ASPIRATION PNEUMONIA: Status: ACTIVE | Noted: 2022-01-01

## 2022-09-19 PROBLEM — I25.10 CORONARY ARTERY DISEASE INVOLVING NATIVE CORONARY ARTERY OF NATIVE HEART WITHOUT ANGINA PECTORIS: Status: ACTIVE | Noted: 2022-01-01

## 2022-09-19 PROBLEM — R41.82 ALTERED MENTAL STATE: Status: ACTIVE | Noted: 2022-01-01

## 2022-09-19 PROBLEM — F03.91 DEMENTIA WITH BEHAVIORAL DISTURBANCE: Status: ACTIVE | Noted: 2022-01-01

## 2022-09-19 PROBLEM — I10 ESSENTIAL HYPERTENSION: Status: ACTIVE | Noted: 2022-01-01

## 2022-09-19 NOTE — PHYSICAL EXAM
[No Acute Distress] : no acute distress [Normal Sclera/Conjunctiva] : normal sclera/conjunctiva [PERRL] : pupils equal, round and reactive to light [EOMI] : extra ocular movement intact [Normal Outer Ear/Nose] : the ears and nose were normal in appearance [Normal Oropharynx] : the oropharynx was normal [No JVD] : no jugular venous distention [Supple] : the neck was supple [No Respiratory Distress] : no respiratory distress [Clear to Auscultation] : lungs were clear to auscultation bilaterally [No Accessory Muscle Use] : no accessory muscle use [Normal Rate] : heart rate was normal  [Normal Supraclavicular Nodes] : no supraclavicular lymphadenopathy [Normal Post Cervical Nodes] : no posterior cervical lymphadenopathy [Normal Anterior Cervical Nodes] : no anterior cervical lymphadenopathy [No Spinal Tenderness] : no spinal tenderness [Kyphosis] :  kyphosis present [No Joint Swelling] : no joint swelling seen [No Gross Sensory Deficits] : no gross sensory deficits [Foot Ulcers] : no foot ulcers [de-identified] : Appears confused from baseline [de-identified] : unsteady gait, one person assist, uses walker [de-identified] : Demented, guarded, becomes agitated at times

## 2022-09-19 NOTE — CHRONIC CARE ASSESSMENT
[PPS Score: ____] : Palliative Performance Scale (PPS) Score: [unfilled] [FAST Score: ____] : Functional Assessment Scale (FAST) Score: [unfilled] [Limited decision making ability] : limited decision making ability [de-identified] : Cannot due to disability [de-identified] : Regular diet- found to cough with water- discussed thicken liquids, nectar thick fluids

## 2022-09-19 NOTE — CURRENT MEDS
[Medication and Allergies Reconciled] : medication and allergies reconciled [High Risk Medications Reviewed and Reconciled (Beers Criteria)] : high risk medications reviewed and reconciled [Reviewed patient reported medication adherence from Comprehensive Assessment] : Reviewed patient reported medication adherence from comprehensive assessment [Adherent to medications] : Patient is adherent to medications as prescribed [de-identified] : Needs assistance with medications

## 2022-09-19 NOTE — HISTORY OF PRESENT ILLNESS
[FreeTextEntry2] : 09/19/2022 COVID SCREEN:\par Patient or caretaker denies fever, cough, trouble breathing, rash, vomiting. Patient has not been in close contact with anyone who is COVID-19 positive, or suspected of having COVID-19.\par \par N95 mask, gloves, eye wear and gown (if indicated) used during visit: Yes.\par \par Total face to face time with patient is 45 min.\par \par  \par MARITZA WEAVER is an 89 year with Hypertension, CAD s/p 2 stents, Dementia with behavior disturbance, multiple falls, unsteady gait, constipation seen today for follow up home visit.\par \par Accompanying patient today is HHA Mr Nuris, daughter Nuha called via telephone.\par \par Patient with recent change in mental status- was started on Moxifloxacin for possible aspiration pneumonia. CXR and labs sent.\par \par Patient 24hr care. \par \par Patient/ patient's caregiver reports no weight loss >10 lbs in the past 6 months. No changes in dentition or swallowing reported, No changes in hearing or vision reported. Patient denies any symptoms of depression or anxiety. Patient is ADL dependent and IADL dependent. \par \par \par

## 2022-09-19 NOTE — COUNSELING
[Normal Weight - ( BMI  <25 )] : normal weight - ( BMI  <25 ) [TLC diet recommended] : TLC diet recommended [Non - Smoker] : non-smoker [Use assistive device to avoid falls] : use assistive device to avoid falls [Remove clutter and unsafe carpeting to avoid falls] : remove clutter and unsafe carpeting to avoid falls [] : foot exam [Not Recommended] : Aspirin use not recommended due to overall prognosis [Decrease hospital use] : decrease hospital use [Minimize unnecessary interventions] : minimize unnecessary interventions [Discussed disease trajectory with patient/caregiver] : discussed disease trajectory with patient/caregiver [Completed Medical Orders for Life-Sustaining Treatment] : completed medical orders for life-sustaining treatment [DNR] : Code Status: DNR [Comfort] : Treatment Guidelines: Comfort [DNI] : Intubation: DNI [Last Verification Date: _____] [facility]last verification date: [unfilled] [_____] : HCP: [unfilled] [FreeTextEntry3] : Discussed thickened fluid

## 2022-09-19 NOTE — HEALTH RISK ASSESSMENT
[HRA Reviewed] : Health risk assessment reviewed [Full assistance needed] : managing finances [Two or more falls in past year] : Patient reported two or more falls in the past year [No] : The patient does not have visual impairment [TimeGetUpGo] : 0 [de-identified] : Patient needs assistance walking, also uses walker - cannot perform get up and go test

## 2022-09-20 PROBLEM — N17.9 ACUTE KIDNEY INJURY: Status: ACTIVE | Noted: 2022-01-01

## 2022-09-28 PROBLEM — N39.0 ACUTE UTI: Chronic | Status: ACTIVE | Noted: 2022-01-01

## 2022-09-28 PROBLEM — G47.09 OTHER INSOMNIA: Status: ACTIVE | Noted: 2022-01-01

## 2025-02-03 NOTE — DISCHARGE NOTE NURSING/CASE MANAGEMENT/SOCIAL WORK - HISTORY OF COVID-19 VACCINATION
Received fax from [employer] for provider to fill out and fax back to: [phone]   Yes